# Patient Record
Sex: MALE | Race: BLACK OR AFRICAN AMERICAN | NOT HISPANIC OR LATINO | Employment: FULL TIME | URBAN - METROPOLITAN AREA
[De-identification: names, ages, dates, MRNs, and addresses within clinical notes are randomized per-mention and may not be internally consistent; named-entity substitution may affect disease eponyms.]

---

## 2023-05-10 ENCOUNTER — OFFICE VISIT (OUTPATIENT)
Dept: PRIMARY CARE | Facility: CLINIC | Age: 48
End: 2023-05-10
Payer: COMMERCIAL

## 2023-05-10 VITALS
DIASTOLIC BLOOD PRESSURE: 75 MMHG | SYSTOLIC BLOOD PRESSURE: 144 MMHG | HEART RATE: 94 BPM | BODY MASS INDEX: 33.37 KG/M2 | OXYGEN SATURATION: 98 % | HEIGHT: 67 IN | WEIGHT: 212.6 LBS | TEMPERATURE: 97.5 F

## 2023-05-10 DIAGNOSIS — E11.9 CONTROLLED TYPE 2 DIABETES MELLITUS WITHOUT COMPLICATION, WITH LONG-TERM CURRENT USE OF INSULIN (MULTI): Primary | ICD-10-CM

## 2023-05-10 DIAGNOSIS — Z79.4 CONTROLLED TYPE 2 DIABETES MELLITUS WITHOUT COMPLICATION, WITH LONG-TERM CURRENT USE OF INSULIN (MULTI): Primary | ICD-10-CM

## 2023-05-10 PROCEDURE — 3078F DIAST BP <80 MM HG: CPT

## 2023-05-10 PROCEDURE — 3077F SYST BP >= 140 MM HG: CPT

## 2023-05-10 PROCEDURE — 3052F HG A1C>EQUAL 8.0%<EQUAL 9.0%: CPT

## 2023-05-10 PROCEDURE — 1036F TOBACCO NON-USER: CPT

## 2023-05-10 PROCEDURE — 99213 OFFICE O/P EST LOW 20 MIN: CPT

## 2023-05-10 ASSESSMENT — PAIN SCALES - GENERAL: PAINLEVEL: 0-NO PAIN

## 2023-05-10 NOTE — PROGRESS NOTES
Subjective   Patient ID: Juan Mackey is a 47 y.o. male who presents for Follow-up (diabetes).    HPI    47-year-old male with history of rhinitis medicamentosa, DM type II (hemoglobin A1c 8.4 (January 2023), follicular nodular disease s/p total thyroidectomy, mild ANAI presenting for follow-up visit.  Patient requesting information regarding semaglutide.  Patient currently on Trulicity for DM which also benefits weight loss, however patient interested in trying semaglutide for weight loss as his wife has had success in the recent past with this medication.  Patient reports his fasting glucose at home has been ranging between 70-90.  Does report diarrhea contributed to metformin use.  Denies any chest pain, shortness of breath, dizziness    Review of Systems  As per [facility]    Objective       Physical Exam  HENT:      Head: Normocephalic and atraumatic.   Eyes:      General: No scleral icterus.     Conjunctiva/sclera: Conjunctivae normal.   Cardiovascular:      Rate and Rhythm: Normal rate and regular rhythm.      Heart sounds: No murmur heard.     No friction rub. No gallop.   Pulmonary:      Effort: Pulmonary effort is normal. No respiratory distress.      Breath sounds: Normal breath sounds.   Abdominal:      General: There is no distension.      Palpations: Abdomen is soft.      Tenderness: There is no abdominal tenderness.   Musculoskeletal:         General: Normal range of motion.   Skin:     General: Skin is warm and dry.   Neurological:      General: No focal deficit present.      Mental Status: He is alert and oriented to person, place, and time.   Psychiatric:         Mood and Affect: Mood normal.       Assessment/Plan   Problem List Items Addressed This Visit    None  Visit Diagnoses       Controlled type 2 diabetes mellitus without complication, with long-term current use of insulin (CMS/[facility])    -  Primary    Relevant Medications    semaglutide (OZEMPIC) 1 mg/dose (4 mg/3 mL) pen injector          46 yo M  with PMHx of Rhinitis medicamentosa, DM Type 2 (HbA1c 9.4% on 9/2022), Follicular Nodular Disease s/p Total Thyroidectomy, Mild ANAI presents to discuss semaglutide as alternative to Trulicity for DM management.    #Type 2 DM (Last HbA1c 9.4% on 9/2022)  -A1C 8.4% (January 2023)  -Discussed both GLP-1 agonist and that they promote reduction in A1c as well as promoting weight loss.  Patient interested in trialing semaglutide.  -Current weight as of 5/10/23 is 96.4 kg  -Stop Trulicity 4 mg dose  -Start semaglutide at 1 mg weekly injection.  Can titrate up if tolerating appropriately.  -Continue metformin 1000 mg every 12 hours, Basaglar 10 units every afternoon    Discussed with: Dr. Jeter  Return in : 1 month to reassess medication tolerability    Portions of this note were generated using digital voice recognition software, and may contain grammatical errors       Juan Roy, DO  PGY-1, Family Medicine

## 2023-05-16 NOTE — PROGRESS NOTES
I reviewed with the resident the medical history and the resident’s findings on physical examination.  I discussed with the resident the patient’s diagnosis and concur with the treatment plan as documented in the resident note.     Taryn Jeter MD

## 2023-06-29 ENCOUNTER — OFFICE VISIT (OUTPATIENT)
Dept: PRIMARY CARE | Facility: CLINIC | Age: 48
End: 2023-06-29
Payer: COMMERCIAL

## 2023-06-29 VITALS
SYSTOLIC BLOOD PRESSURE: 114 MMHG | RESPIRATION RATE: 16 BRPM | OXYGEN SATURATION: 99 % | TEMPERATURE: 96.3 F | DIASTOLIC BLOOD PRESSURE: 79 MMHG | BODY MASS INDEX: 31.4 KG/M2 | WEIGHT: 200.1 LBS | HEART RATE: 94 BPM | HEIGHT: 67 IN

## 2023-06-29 DIAGNOSIS — E11.9 TYPE 2 DIABETES MELLITUS WITHOUT COMPLICATION, WITHOUT LONG-TERM CURRENT USE OF INSULIN (MULTI): Primary | ICD-10-CM

## 2023-06-29 PROCEDURE — 3078F DIAST BP <80 MM HG: CPT

## 2023-06-29 PROCEDURE — 99213 OFFICE O/P EST LOW 20 MIN: CPT

## 2023-06-29 PROCEDURE — 1036F TOBACCO NON-USER: CPT

## 2023-06-29 PROCEDURE — 3052F HG A1C>EQUAL 8.0%<EQUAL 9.0%: CPT

## 2023-06-29 PROCEDURE — 3074F SYST BP LT 130 MM HG: CPT

## 2023-06-29 RX ORDER — SEMAGLUTIDE 1.34 MG/ML
1 INJECTION, SOLUTION SUBCUTANEOUS
Qty: 3 ML | Refills: 1 | Status: SHIPPED | OUTPATIENT
Start: 2023-06-29 | End: 2023-08-15 | Stop reason: SDUPTHER

## 2023-06-29 ASSESSMENT — PAIN SCALES - GENERAL: PAINLEVEL: 0-NO PAIN

## 2023-06-29 NOTE — PROGRESS NOTES
Subjective   Patient ID: Juan Mackey is a 47 y.o. male who presents for Follow-up. Med refill    HPI    #DM  - Request Ozempic refill  - Insurance rejected refill request initially  - Reports  nearly 20lbs weight loss on ozempic  - Tolerates well. Denies GI symptoms  - Continues to take metformin    Review of Systems   Constitutional:  Negative for fatigue, fever and unexpected weight change.   Respiratory:  Negative for cough, shortness of breath and wheezing.    Cardiovascular:  Negative for chest pain, palpitations and leg swelling.   Gastrointestinal:  Negative for abdominal pain, diarrhea, nausea and vomiting.   Neurological:  Negative for dizziness, light-headedness and headaches.       Previous history  No past medical history on file.  Past Surgical History:   Procedure Laterality Date    OTHER SURGICAL HISTORY  08/28/2020    Appendectomy    OTHER SURGICAL HISTORY  07/19/2022    Corneal lasik     Social History     Tobacco Use    Smoking status: Never     Passive exposure: Never    Smokeless tobacco: Never   Substance Use Topics    Alcohol use: Never    Drug use: Never     No family history on file.  Allergies   Allergen Reactions    Shrimp Angioedema    Oxycodone Dizziness     Current Outpatient Medications   Medication Instructions    Ozempic 1 mg, subcutaneous, Weekly       Objective       Physical Exam  HENT:      Head: Normocephalic and atraumatic.   Eyes:      General: No scleral icterus.     Conjunctiva/sclera: Conjunctivae normal.   Cardiovascular:      Rate and Rhythm: Normal rate and regular rhythm.      Heart sounds: No murmur heard.     No friction rub. No gallop.   Pulmonary:      Effort: Pulmonary effort is normal. No respiratory distress.      Breath sounds: Normal breath sounds.   Abdominal:      General: There is no distension.      Palpations: Abdomen is soft.      Tenderness: There is no abdominal tenderness.   Musculoskeletal:         General: Normal range of motion.   Skin:      General: Skin is warm and dry.   Neurological:      General: No focal deficit present.      Mental Status: He is alert and oriented to person, place, and time.   Psychiatric:         Mood and Affect: Mood normal.       Assessment/Plan   Juan Mackey is a 47 y.o. male who presents for the concerns below:    Problem List Items Addressed This Visit    None  Visit Diagnoses       Type 2 diabetes mellitus without complication, without long-term current use of insulin (CMS/[facility])    -  Primary    Relevant Medications    semaglutide (Ozempic) 1 mg/dose (4 mg/3 mL) pen injector    Other Relevant Orders    Hemoglobin A1c          #DM  - Ozempic refill  - Continues to take metformin  - Repeat A1C in 2 months       Discussed with:  Dr. Roldan  Return in :2 months    Portions of this note were generated using digital voice recognition software, and may contain grammatical errors       Juan Roy, DO  PGY-1, Family Medicine

## 2023-06-30 ASSESSMENT — ENCOUNTER SYMPTOMS
DIZZINESS: 0
DIARRHEA: 0
NAUSEA: 0
FATIGUE: 0
WHEEZING: 0
LIGHT-HEADEDNESS: 0
FEVER: 0
ABDOMINAL PAIN: 0
SHORTNESS OF BREATH: 0
UNEXPECTED WEIGHT CHANGE: 0
COUGH: 0
PALPITATIONS: 0
VOMITING: 0
HEADACHES: 0

## 2023-07-03 NOTE — PROGRESS NOTES
I reviewed with the resident the medical history and the resident’s findings on physical examination.  I discussed with the resident the patient’s diagnosis and concur with the treatment plan as documented in the resident note.     Zach Roldan MD

## 2023-08-12 ENCOUNTER — LAB (OUTPATIENT)
Dept: LAB | Facility: LAB | Age: 48
End: 2023-08-12
Payer: COMMERCIAL

## 2023-08-12 DIAGNOSIS — E11.9 TYPE 2 DIABETES MELLITUS WITHOUT COMPLICATION, WITHOUT LONG-TERM CURRENT USE OF INSULIN (MULTI): ICD-10-CM

## 2023-08-12 LAB
ESTIMATED AVERAGE GLUCOSE FOR HBA1C: 364 MG/DL
HEMOGLOBIN A1C/HEMOGLOBIN TOTAL IN BLOOD: 14.3 %

## 2023-08-12 PROCEDURE — 83036 HEMOGLOBIN GLYCOSYLATED A1C: CPT

## 2023-08-12 PROCEDURE — 36415 COLL VENOUS BLD VENIPUNCTURE: CPT

## 2023-08-15 ENCOUNTER — OFFICE VISIT (OUTPATIENT)
Dept: PRIMARY CARE | Facility: CLINIC | Age: 48
End: 2023-08-15
Payer: COMMERCIAL

## 2023-08-15 VITALS
OXYGEN SATURATION: 98 % | HEART RATE: 87 BPM | WEIGHT: 200 LBS | HEIGHT: 67 IN | SYSTOLIC BLOOD PRESSURE: 128 MMHG | BODY MASS INDEX: 31.39 KG/M2 | DIASTOLIC BLOOD PRESSURE: 87 MMHG | TEMPERATURE: 97.9 F

## 2023-08-15 DIAGNOSIS — E11.9 TYPE 2 DIABETES MELLITUS WITHOUT COMPLICATION, WITHOUT LONG-TERM CURRENT USE OF INSULIN (MULTI): Primary | ICD-10-CM

## 2023-08-15 DIAGNOSIS — T75.3XXA MOTION SICKNESS, INITIAL ENCOUNTER: ICD-10-CM

## 2023-08-15 PROCEDURE — 1036F TOBACCO NON-USER: CPT | Performed by: STUDENT IN AN ORGANIZED HEALTH CARE EDUCATION/TRAINING PROGRAM

## 2023-08-15 PROCEDURE — 3074F SYST BP LT 130 MM HG: CPT | Performed by: STUDENT IN AN ORGANIZED HEALTH CARE EDUCATION/TRAINING PROGRAM

## 2023-08-15 PROCEDURE — 3046F HEMOGLOBIN A1C LEVEL >9.0%: CPT | Performed by: STUDENT IN AN ORGANIZED HEALTH CARE EDUCATION/TRAINING PROGRAM

## 2023-08-15 PROCEDURE — 99214 OFFICE O/P EST MOD 30 MIN: CPT | Performed by: STUDENT IN AN ORGANIZED HEALTH CARE EDUCATION/TRAINING PROGRAM

## 2023-08-15 PROCEDURE — 3079F DIAST BP 80-89 MM HG: CPT | Performed by: STUDENT IN AN ORGANIZED HEALTH CARE EDUCATION/TRAINING PROGRAM

## 2023-08-15 RX ORDER — INSULIN GLARGINE 100 [IU]/ML
10 INJECTION, SOLUTION SUBCUTANEOUS NIGHTLY
COMMUNITY
End: 2023-08-15 | Stop reason: SDUPTHER

## 2023-08-15 RX ORDER — LEVOTHYROXINE SODIUM 150 UG/1
150 TABLET ORAL
COMMUNITY
Start: 2023-07-22 | End: 2024-04-17 | Stop reason: SDUPTHER

## 2023-08-15 RX ORDER — SEMAGLUTIDE 1.34 MG/ML
2 INJECTION, SOLUTION SUBCUTANEOUS
Qty: 6 ML | Refills: 1 | Status: SHIPPED | OUTPATIENT
Start: 2023-08-15 | End: 2023-10-30 | Stop reason: SDUPTHER

## 2023-08-15 RX ORDER — INSULIN GLARGINE 100 [IU]/ML
10 INJECTION, SOLUTION SUBCUTANEOUS NIGHTLY
Qty: 9 ML | Refills: 0 | Status: SHIPPED | OUTPATIENT
Start: 2023-08-15 | End: 2023-12-26 | Stop reason: SDUPTHER

## 2023-08-15 RX ORDER — SCOLOPAMINE TRANSDERMAL SYSTEM 1 MG/1
1 PATCH, EXTENDED RELEASE TRANSDERMAL
Qty: 10 PATCH | Refills: 0 | Status: SHIPPED | OUTPATIENT
Start: 2023-08-15 | End: 2023-09-14

## 2023-08-15 RX ORDER — METFORMIN HYDROCHLORIDE 1000 MG/1
1000 TABLET ORAL
Qty: 180 TABLET | Refills: 0 | Status: SHIPPED | OUTPATIENT
Start: 2023-08-15 | End: 2023-11-22

## 2023-08-15 RX ORDER — METFORMIN HYDROCHLORIDE 1000 MG/1
1000 TABLET ORAL
COMMUNITY
End: 2023-08-15 | Stop reason: SDUPTHER

## 2023-08-15 ASSESSMENT — PAIN SCALES - GENERAL: PAINLEVEL: 0-NO PAIN

## 2023-08-15 NOTE — PROGRESS NOTES
"Subjective   Patient ID: Juan Mackey is a 47 y.o. male who presents for Follow-up and New Med Request (For Nausea, and refill on Ozempic).    HPI   Juan Mackey is a 47 y.o. male who presents for Follow-up on DM2.  Patient requesting medication for motion sickness for upcoming medication.    # Diabetes  - Current regimen: Metformin 1000 mg BID, Basaglar 10 units QHS, Ozempic 1 mg weekly.  Report being compliant with medications.  - Home Glucose Monitorin-100's in AM, fasting  - Hypoglycemic episodes: denies  - Diabetes Surveillance: A1c 8.4 -> 14.3%  - Denies any polyuria, polydipsia, dysuria, fatigue, weight loss, changes in vision, changes in bowels, HA dizziness, lightheadedness, or weakness.      Review of Systems    ROS:    - CONSTITUTIONAL: Denies weight loss, fever and chills.    - HEENT: Denies changes in vision and hearing.    - RESPIRATORY: Denies SOB and cough.    - CV: Denies palpitations and CP.    - GI: Denies abdominal pain, nausea, vomiting and diarrhea.    - : Denies dysuria and urinary frequency.    - MSK: Denies myalgia and joint pain.    - SKIN: Denies rash and pruritus.    - NEUROLOGICAL: Denies headache and syncope.    - PSYCHIATRIC: Denies recent changes in mood. Denies anxiety and depression.    A 12-point review of systems was completed and is otherwise negative except as noted in the HPI.      Objective   /87 (BP Location: Left arm, Patient Position: Sitting, BP Cuff Size: Adult)   Pulse 87   Temp 36.6 °C (97.9 °F) (Temporal)   Ht 1.702 m (5' 7\")   Wt 90.7 kg (200 lb)   SpO2 98%   BMI 31.32 kg/m²     Lab Review  Glucose (mg/dL)   Date Value   2023 161 (H)   2022 184 (H)   2021 129 (H)     Hemoglobin A1C (%)   Date Value   2023 14.3 (A)   2023 8.4 (A)   2022 9.4 (A)     Bicarbonate (mmol/L)   Date Value   2023 29   2022 27   2021 27     Urea Nitrogen (mg/dL)   Date Value   2023 10   2022 17   2021 " 15     Creatinine (mg/dL)   Date Value   01/07/2023 1.07   05/13/2022 1.01   01/16/2021 1.03       Physical Exam    PHYSICAL EXAM:    - GENERAL: Alert and oriented x 3. No acute distress. Well-nourished.    - EYES: EOMI. Anicteric.    - HENT: Moist mucous membranes. No scleral icterus. No cervical lymphadenopathy.    - LUNGS: Clear to auscultation bilaterally. No accessory muscle use.    - CARDIOVASCULAR: Regular rate and rhythm. No murmur. No JVD.    - ABDOMEN: Soft, non-tender and non-distended. No palpable masses.    - EXTREMITIES: No edema. Non-tender.    - SKIN: No rashes or lesions. Warm.    - NEUROLOGIC: No focal neurological deficits. CN II-XII grossly intact, but not individually tested.    - PSYCHIATRIC: Cooperative. Appropriate mood and affect.      Assessment/Plan       Juan Mackey is a 47 y.o. male who presents for Follow-up on DM2.     #DM2, uncontrolled   -     insulin glargine (Basaglar KwikPen U-100 Insulin) 100 unit/mL (3 mL) pen; Inject 10 Units under the skin once daily at bedtime. Take as directed per insulin instructions.  -     metFORMIN (Glucophage) 1,000 mg tablet; Take 1 tablet (1,000 mg) by mouth 2 times a day with meals.  -     semaglutide (Ozempic) 1 mg/dose (4 mg/3 mL) pen injector; Inject 2 mg under the skin 1 (one) time per week.  - A1c could be 2/2 lab error given good glucose control at home and compliance with medications.. Plan to repeat in 4 weeks along with renal function panel.  -Return to clinic for follow-up in 4 weeks with Dr. Roy      RX changes: Increased Ozempic from 1 mg to 2 mg weekly based on available formulations.    Education:  blood sugar goals, exercise, and nutrition  Follow up: I recommend diabetes care be 1 month.    #Motion sickness. Planned travel   -     scopolamine (Transderm-Scop) 1 mg over 3 days patch 3 day; Place 1 patch over 72 hours on the skin every 3rd day if needed (nausea).      Patient seen and discussed with attending physician   Gabriel Mann MD  Family Medicine, PGY-3    This note was completed using Dragon voice recognition technology and may include unintended errors with respect to translation of words, typographical errors or grammar errors which may not have been identified while finalizing the chart.

## 2023-08-22 NOTE — PROGRESS NOTES
I saw and evaluated the patient. I personally obtained the key and critical portions of the history and physical exam or was physically present for key and critical portions performed by the resident/fellow. I reviewed the resident/fellow's documentation and discussed the patient with the resident/fellow. I agree with the resident/fellow's medical decision making as documented in the note.    Note he has had 6 kg of intended weight loss--thus we did not increase basal insulin, since FBG has been in or near desired ranged according to his reports, but did recommend continuing ozempic and following up with A1C next month.     Shannon Rios MD

## 2023-10-02 ENCOUNTER — OFFICE VISIT (OUTPATIENT)
Dept: PRIMARY CARE | Facility: CLINIC | Age: 48
End: 2023-10-02
Payer: COMMERCIAL

## 2023-10-02 VITALS
BODY MASS INDEX: 31.23 KG/M2 | HEART RATE: 88 BPM | OXYGEN SATURATION: 100 % | WEIGHT: 199 LBS | DIASTOLIC BLOOD PRESSURE: 71 MMHG | SYSTOLIC BLOOD PRESSURE: 110 MMHG | TEMPERATURE: 98 F | HEIGHT: 67 IN

## 2023-10-02 DIAGNOSIS — E11.8 CONTROLLED DIABETES MELLITUS TYPE 2 WITH COMPLICATIONS, UNSPECIFIED WHETHER LONG TERM INSULIN USE (MULTI): Primary | ICD-10-CM

## 2023-10-02 PROCEDURE — 3078F DIAST BP <80 MM HG: CPT

## 2023-10-02 PROCEDURE — 3074F SYST BP LT 130 MM HG: CPT

## 2023-10-02 PROCEDURE — 3046F HEMOGLOBIN A1C LEVEL >9.0%: CPT

## 2023-10-02 PROCEDURE — 99213 OFFICE O/P EST LOW 20 MIN: CPT

## 2023-10-02 PROCEDURE — 1036F TOBACCO NON-USER: CPT

## 2023-10-02 PROCEDURE — 90686 IIV4 VACC NO PRSV 0.5 ML IM: CPT

## 2023-10-02 PROCEDURE — 90471 IMMUNIZATION ADMIN: CPT

## 2023-10-02 PROCEDURE — 4010F ACE/ARB THERAPY RXD/TAKEN: CPT

## 2023-10-02 RX ORDER — LISINOPRIL 2.5 MG/1
2.5 TABLET ORAL DAILY
Qty: 30 TABLET | Refills: 5 | Status: SHIPPED | OUTPATIENT
Start: 2023-10-02 | End: 2024-05-22 | Stop reason: RX

## 2023-10-02 RX ORDER — INSULIN PUMP SYRINGE, 3 ML
1 EACH MISCELLANEOUS ONCE
Qty: 1 EACH | Refills: 0 | Status: SHIPPED | OUTPATIENT
Start: 2023-10-02 | End: 2023-10-02

## 2023-10-02 ASSESSMENT — ENCOUNTER SYMPTOMS
LOSS OF SENSATION IN FEET: 0
DEPRESSION: 0
OCCASIONAL FEELINGS OF UNSTEADINESS: 0

## 2023-10-02 ASSESSMENT — PATIENT HEALTH QUESTIONNAIRE - PHQ9
2. FEELING DOWN, DEPRESSED OR HOPELESS: NOT AT ALL
SUM OF ALL RESPONSES TO PHQ9 QUESTIONS 1 AND 2: 0
1. LITTLE INTEREST OR PLEASURE IN DOING THINGS: NOT AT ALL

## 2023-10-02 ASSESSMENT — COLUMBIA-SUICIDE SEVERITY RATING SCALE - C-SSRS
2. HAVE YOU ACTUALLY HAD ANY THOUGHTS OF KILLING YOURSELF?: NO
1. IN THE PAST MONTH, HAVE YOU WISHED YOU WERE DEAD OR WISHED YOU COULD GO TO SLEEP AND NOT WAKE UP?: NO
6. HAVE YOU EVER DONE ANYTHING, STARTED TO DO ANYTHING, OR PREPARED TO DO ANYTHING TO END YOUR LIFE?: NO

## 2023-10-02 ASSESSMENT — PAIN SCALES - GENERAL: PAINLEVEL: 0-NO PAIN

## 2023-10-02 NOTE — PROGRESS NOTES
Subjective   47-year-old male with history of ANAI, rhinitis medicamentosa, follicular thyroid CA s/p thyroidectomy, and type 2 diabetes presenting for follow-up.    HPI  Patient was recently found in August 2023 to have A1c of 14.3, although patient did maintain home blood glucose readings 150s-160s.  At that time, patient Ozempic dose increased from 1 mg to 2 mg weekly and instructed to continue his current DM management with metformin and Lantus.  Patient states that he is tolerating his increased Ozempic dose well without any GI side effects.  Denies any headache, nausea, vomiting, chest pain, shortness of breath.  Denies any foot ulcerations or vision changes.  Patient does request new glucose monitor as it is previous Freestyle lite is no longer functioning.    Review of Systems  As per [facility]    Previous history  No past medical history on file.  Past Surgical History:   Procedure Laterality Date    OTHER SURGICAL HISTORY  08/28/2020    Appendectomy    OTHER SURGICAL HISTORY  07/19/2022    Corneal lasik     Social History     Tobacco Use    Smoking status: Never     Passive exposure: Never    Smokeless tobacco: Never   Substance Use Topics    Alcohol use: Never    Drug use: Never     No family history on file.  Allergies   Allergen Reactions    Shrimp Angioedema    Oxycodone Dizziness     Current Outpatient Medications   Medication Instructions    FreeStyle glucose monitoring (FreeStyle Lite Meter) kit 1 each, miscellaneous, Once    insulin glargine (BASAGLAR KWIKPEN U-100 INSULIN) 10 Units, subcutaneous, Nightly, Take as directed per insulin instructions.    levothyroxine (SYNTHROID, LEVOXYL) 150 mcg, oral, Daily before breakfast    lisinopril 2.5 mg, oral, Daily    metFORMIN (GLUCOPHAGE) 1,000 mg, oral, 2 times daily with meals    Ozempic 2 mg, subcutaneous, Weekly       Objective   Physical Exam  HENT:      Head: Normocephalic and atraumatic.   Eyes:      General: No scleral icterus.     Conjunctiva/sclera:  Conjunctivae normal.   Cardiovascular:      Rate and Rhythm: Normal rate and regular rhythm.      Heart sounds: No murmur heard.     No friction rub. No gallop.   Pulmonary:      Effort: Pulmonary effort is normal. No respiratory distress.      Breath sounds: Normal breath sounds.   Abdominal:      General: There is no distension.      Palpations: Abdomen is soft.      Tenderness: There is no abdominal tenderness.   Musculoskeletal:         General: Normal range of motion.   Skin:     General: Skin is warm and dry.   Neurological:      General: No focal deficit present.      Mental Status: He is alert and oriented to person, place, and time.   Psychiatric:         Mood and Affect: Mood normal.     Assessment/Plan   Juan Mackey is a 47 y.o. male who presents for the concerns below:    Problem List Items Addressed This Visit    None  Visit Diagnoses       Controlled diabetes mellitus type 2 with complications, unspecified whether long term insulin use (CMS/[facility])    -  Primary    Relevant Medications    FreeStyle glucose monitoring (FreeStyle Lite Meter) kit    lisinopril 2.5 mg tablet          #DM2  -14.3% 8/23  -Reordered Freestyle lite glucose monitor  -Schedule repeat RFP, A1c, and protein/creatinine ratio urine for next month  - C/w ozempic 2mg  weekly  -Continue with Lantus and metformin  -Start Renal protection w/ Lisinopril 2.5mg qd    #Health maintenance  -Provided flu vaccine today     Discussed with: Dr. Muller  Return in : 3 months    Portions of this note were generated using digital voice recognition software, and may contain grammatical errors     Juan Roy, DO  PGY-2, Family Medicine

## 2023-10-06 NOTE — PROGRESS NOTES
I reviewed the resident/fellow's documentation and discussed the patient with the resident/fellow. I agree with the resident/fellow's medical decision making as documented in the note.     Sarah Muller MD

## 2023-10-26 ENCOUNTER — TELEPHONE (OUTPATIENT)
Dept: PRIMARY CARE | Facility: CLINIC | Age: 48
End: 2023-10-26

## 2023-10-26 DIAGNOSIS — E11.9 TYPE 2 DIABETES MELLITUS WITHOUT COMPLICATION, WITHOUT LONG-TERM CURRENT USE OF INSULIN (MULTI): ICD-10-CM

## 2023-10-30 DIAGNOSIS — E11.9 TYPE 2 DIABETES MELLITUS WITHOUT COMPLICATION, WITHOUT LONG-TERM CURRENT USE OF INSULIN (MULTI): ICD-10-CM

## 2023-10-30 RX ORDER — SEMAGLUTIDE 1.34 MG/ML
2 INJECTION, SOLUTION SUBCUTANEOUS
Refills: 1 | OUTPATIENT
Start: 2023-10-30

## 2023-10-30 RX ORDER — SEMAGLUTIDE 1.34 MG/ML
2 INJECTION, SOLUTION SUBCUTANEOUS
Qty: 6 ML | Refills: 1 | Status: SHIPPED | OUTPATIENT
Start: 2023-10-30 | End: 2023-12-21 | Stop reason: SDUPTHER

## 2023-11-03 NOTE — TELEPHONE ENCOUNTER
Rx Refill Request Telephone Encounter    Name:  Juan Mackey  :  [id number]  Medication Name:  semaglutide (Ozempic) 1 mg/dose (4 mg/3 mL) pen injector             Specific Pharmacy location:   [id number] [address] [address] [place]    Date of last appointment:  10/02/23  Date of next appointment:    Best number to reach patient:

## 2023-11-07 NOTE — TELEPHONE ENCOUNTER
Rx Refill Request Telephone Encounter    Name:  Juan Mackey  :  [id number]  Medication Name:  Ozempic            Specific Pharmacy location:  [facility]  Date of last appointment:  10/2/2023  Date of next appointment:  2023  Best number to reach patient:  [phone]    Patient states he is confused because he started with Dr Roy taking 1 dose, but is now taking 2 doses with Dr Mann. Patient also states that he will not have enough medication to last him until his December appointment. Patient would like a call back

## 2023-11-18 DIAGNOSIS — E11.9 TYPE 2 DIABETES MELLITUS WITHOUT COMPLICATION, WITHOUT LONG-TERM CURRENT USE OF INSULIN (MULTI): ICD-10-CM

## 2023-11-22 RX ORDER — METFORMIN HYDROCHLORIDE 1000 MG/1
1000 TABLET ORAL
Qty: 180 TABLET | Refills: 0 | Status: SHIPPED | OUTPATIENT
Start: 2023-11-22 | End: 2024-02-22 | Stop reason: SDUPTHER

## 2023-12-21 ENCOUNTER — OFFICE VISIT (OUTPATIENT)
Dept: PRIMARY CARE | Facility: CLINIC | Age: 48
End: 2023-12-21
Payer: COMMERCIAL

## 2023-12-21 VITALS
BODY MASS INDEX: 31.48 KG/M2 | SYSTOLIC BLOOD PRESSURE: 127 MMHG | WEIGHT: 200.6 LBS | TEMPERATURE: 97.9 F | OXYGEN SATURATION: 97 % | HEIGHT: 67 IN | DIASTOLIC BLOOD PRESSURE: 87 MMHG | HEART RATE: 102 BPM

## 2023-12-21 DIAGNOSIS — E11.9 TYPE 2 DIABETES MELLITUS WITHOUT COMPLICATION, WITHOUT LONG-TERM CURRENT USE OF INSULIN (MULTI): ICD-10-CM

## 2023-12-21 PROCEDURE — 99213 OFFICE O/P EST LOW 20 MIN: CPT

## 2023-12-21 PROCEDURE — 3074F SYST BP LT 130 MM HG: CPT

## 2023-12-21 PROCEDURE — 4010F ACE/ARB THERAPY RXD/TAKEN: CPT

## 2023-12-21 PROCEDURE — 3046F HEMOGLOBIN A1C LEVEL >9.0%: CPT

## 2023-12-21 PROCEDURE — 1036F TOBACCO NON-USER: CPT

## 2023-12-21 PROCEDURE — 3079F DIAST BP 80-89 MM HG: CPT

## 2023-12-21 ASSESSMENT — PAIN SCALES - GENERAL: PAINLEVEL: 0-NO PAIN

## 2023-12-21 NOTE — PROGRESS NOTES
Subjective   Patient ID: Juan Mackey is a 48 y.o. male who presents for Follow-up.    HPI    #DM2  - last seen in October  - A1C 14.3% 8/23. Repeat A1C never ordered  - Reports stopped taking Lisinopril 2.5mg because he never received his updated A1C  -Currently using Freestyle lite glucose monitor. BG within normal limits  - taking ozempic 2mg  weekly, Lantus 10U nightly, and metformin 1000mg BID    Review of Systems   Constitutional:  Negative for fatigue, fever and unexpected weight change.   Respiratory:  Negative for cough, shortness of breath and wheezing.    Cardiovascular:  Negative for chest pain, palpitations and leg swelling.   Gastrointestinal:  Negative for abdominal pain, diarrhea, nausea and vomiting.   Neurological:  Negative for dizziness, light-headedness and headaches.     Previous history  No past medical history on file.  Past Surgical History:   Procedure Laterality Date    OTHER SURGICAL HISTORY  08/28/2020    Appendectomy    OTHER SURGICAL HISTORY  07/19/2022    Corneal lasik     Social History     Tobacco Use    Smoking status: Never     Passive exposure: Never    Smokeless tobacco: Never   Substance Use Topics    Alcohol use: Never    Drug use: Never     No family history on file.  Allergies   Allergen Reactions    Shrimp Angioedema    Oxycodone Dizziness     Current Outpatient Medications   Medication Instructions    insulin glargine (BASAGLAR KWIKPEN U-100 INSULIN) 10 Units, subcutaneous, Nightly, Take as directed per insulin instructions.    levothyroxine (SYNTHROID, LEVOXYL) 150 mcg, oral, Daily before breakfast    lisinopril 2.5 mg, oral, Daily    metFORMIN (GLUCOPHAGE) 1,000 mg, oral, 2 times daily with meals    semaglutide 2 mg, subcutaneous, Weekly       Objective       Physical Exam  HENT:      Head: Normocephalic and atraumatic.   Eyes:      General: No scleral icterus.     Conjunctiva/sclera: Conjunctivae normal.   Cardiovascular:      Rate and Rhythm: Normal rate and regular  rhythm.      Heart sounds: No murmur heard.     No friction rub. No gallop.   Pulmonary:      Effort: Pulmonary effort is normal. No respiratory distress.      Breath sounds: Normal breath sounds.   Abdominal:      General: There is no distension.      Palpations: Abdomen is soft.      Tenderness: There is no abdominal tenderness.   Musculoskeletal:         General: Normal range of motion.   Skin:     General: Skin is warm and dry.   Neurological:      General: No focal deficit present.      Mental Status: He is alert and oriented to person, place, and time.   Psychiatric:         Mood and Affect: Mood normal.       Assessment/Plan   Juan Mackey is a 48 y.o. male who presents for the concerns below:    Problem List Items Addressed This Visit    None  Visit Diagnoses       Type 2 diabetes mellitus without complication, without long-term current use of insulin (CMS/[facility])        Relevant Medications    semaglutide 2 mg/dose (8 mg/3 mL) pen injector    insulin glargine (Basaglar KwikPen U-100 Insulin) 100 unit/mL (3 mL) pen    Other Relevant Orders    Hemoglobin A1c        #DM2  - Repeat A1C  - Encouraged to continue Lisinopril 2.5mg for added renal protection  - Reorder ozempic 2mg  weekly and Lantus 10U nightly  - c/w metformin 1000mg BID      Discussed with:  Dr. Roldan  Return in : 3 months    Portions of this note were generated using digital voice recognition software, and may contain grammatical errors       Juan Roy, DO  PGY-2, Family Medicine

## 2023-12-26 RX ORDER — INSULIN GLARGINE 100 [IU]/ML
10 INJECTION, SOLUTION SUBCUTANEOUS NIGHTLY
Qty: 9 ML | Refills: 3 | Status: SHIPPED | OUTPATIENT
Start: 2023-12-26 | End: 2024-02-22

## 2023-12-26 ASSESSMENT — ENCOUNTER SYMPTOMS
COUGH: 0
UNEXPECTED WEIGHT CHANGE: 0
FEVER: 0
DIARRHEA: 0
HEADACHES: 0
NAUSEA: 0
DIZZINESS: 0
LIGHT-HEADEDNESS: 0
VOMITING: 0
PALPITATIONS: 0
ABDOMINAL PAIN: 0
FATIGUE: 0
SHORTNESS OF BREATH: 0
WHEEZING: 0

## 2023-12-28 NOTE — PROGRESS NOTES
I reviewed the resident/fellow's documentation and discussed the patient with the resident/fellow. I agree with the resident/fellow's medical decision making as documented in the note.    Zach Roldan MD

## 2024-01-29 ENCOUNTER — DOCUMENTATION (OUTPATIENT)
Dept: PRIMARY CARE | Facility: CLINIC | Age: 49
End: 2024-01-29
Payer: COMMERCIAL

## 2024-01-29 ENCOUNTER — TELEPHONE (OUTPATIENT)
Dept: PRIMARY CARE | Facility: CLINIC | Age: 49
End: 2024-01-29
Payer: COMMERCIAL

## 2024-02-17 ENCOUNTER — LAB (OUTPATIENT)
Dept: LAB | Facility: LAB | Age: 49
End: 2024-02-17
Payer: COMMERCIAL

## 2024-02-17 DIAGNOSIS — E11.9 TYPE 2 DIABETES MELLITUS WITHOUT COMPLICATION, WITHOUT LONG-TERM CURRENT USE OF INSULIN (MULTI): ICD-10-CM

## 2024-02-17 LAB
EST. AVERAGE GLUCOSE BLD GHB EST-MCNC: 341 MG/DL
HBA1C MFR BLD: 13.5 %

## 2024-02-17 PROCEDURE — 83036 HEMOGLOBIN GLYCOSYLATED A1C: CPT

## 2024-02-17 PROCEDURE — 36415 COLL VENOUS BLD VENIPUNCTURE: CPT

## 2024-02-22 ENCOUNTER — OFFICE VISIT (OUTPATIENT)
Dept: PRIMARY CARE | Facility: CLINIC | Age: 49
End: 2024-02-22
Payer: COMMERCIAL

## 2024-02-22 VITALS
DIASTOLIC BLOOD PRESSURE: 89 MMHG | HEART RATE: 90 BPM | OXYGEN SATURATION: 99 % | SYSTOLIC BLOOD PRESSURE: 135 MMHG | TEMPERATURE: 97.3 F | BODY MASS INDEX: 31.77 KG/M2 | HEIGHT: 67 IN | WEIGHT: 202.4 LBS

## 2024-02-22 DIAGNOSIS — E11.9 TYPE 2 DIABETES MELLITUS WITHOUT COMPLICATION, WITHOUT LONG-TERM CURRENT USE OF INSULIN (MULTI): Primary | ICD-10-CM

## 2024-02-22 DIAGNOSIS — T75.3XXA MOTION SICKNESS, INITIAL ENCOUNTER: ICD-10-CM

## 2024-02-22 LAB — POC FINGERSTICK BLOOD GLUCOSE: 256 MG/DL (ref 70–100)

## 2024-02-22 PROCEDURE — 82962 GLUCOSE BLOOD TEST: CPT

## 2024-02-22 PROCEDURE — 3075F SYST BP GE 130 - 139MM HG: CPT

## 2024-02-22 PROCEDURE — 99213 OFFICE O/P EST LOW 20 MIN: CPT

## 2024-02-22 PROCEDURE — 3046F HEMOGLOBIN A1C LEVEL >9.0%: CPT

## 2024-02-22 PROCEDURE — 1036F TOBACCO NON-USER: CPT

## 2024-02-22 PROCEDURE — 4010F ACE/ARB THERAPY RXD/TAKEN: CPT

## 2024-02-22 PROCEDURE — 3079F DIAST BP 80-89 MM HG: CPT

## 2024-02-22 RX ORDER — SCOLOPAMINE TRANSDERMAL SYSTEM 1 MG/1
1 PATCH, EXTENDED RELEASE TRANSDERMAL
Qty: 10 PATCH | Refills: 0 | Status: SHIPPED | OUTPATIENT
Start: 2024-02-22 | End: 2024-04-22

## 2024-02-22 RX ORDER — METFORMIN HYDROCHLORIDE 1000 MG/1
1000 TABLET ORAL
Qty: 180 TABLET | Refills: 0 | Status: SHIPPED | OUTPATIENT
Start: 2024-02-22 | End: 2024-05-22 | Stop reason: SDUPTHER

## 2024-02-22 RX ORDER — INSULIN GLARGINE 100 [IU]/ML
13 INJECTION, SOLUTION SUBCUTANEOUS NIGHTLY
Qty: 11.7 ML | Refills: 3 | Status: SHIPPED | OUTPATIENT
Start: 2024-02-22 | End: 2024-03-01 | Stop reason: SDUPTHER

## 2024-02-22 ASSESSMENT — PAIN SCALES - GENERAL: PAINLEVEL: 0-NO PAIN

## 2024-02-22 NOTE — PROGRESS NOTES
Subjective   Patient ID: Juan Mackey is a 48 y.o. male who presents for Follow-up.    HPI  #DM2  - Repeat A1C 13.5 Feb 2024 (14.3 Aug 2023)  - Lisinopril 2.5mg for added renal protection  - takes ozempic 2mg  weekly and Lantus 10U nightly  - takes metformin 1000mg BID  - am BG 170s previously 70s a couple months ago  - reinforced diabetic diet avoid carbs  - exercise limited by time. Should have more available time after debut[place]    #flight anxiety   - plan to go to [facility] ( 7 hour flight)    Review of Systems    Previous history  No past medical history on file.  Past Surgical History:   Procedure Laterality Date    OTHER SURGICAL HISTORY  08/28/2020    Appendectomy    OTHER SURGICAL HISTORY  07/19/2022    Corneal lasik     Social History     Tobacco Use    Smoking status: Never     Passive exposure: Never    Smokeless tobacco: Never   Substance Use Topics    Alcohol use: Never    Drug use: Never     No family history on file.  Allergies   Allergen Reactions    Shrimp Angioedema    Oxycodone Dizziness     Current Outpatient Medications   Medication Instructions    insulin glargine (LANTUS) 13 Units, subcutaneous, Nightly, Take as directed per insulin instructions    levothyroxine (SYNTHROID, LEVOXYL) 150 mcg, oral, Daily before breakfast    lisinopril 2.5 mg, oral, Daily    metFORMIN (GLUCOPHAGE) 1,000 mg, oral, 2 times daily with meals    scopolamine (Transderm-Scop) 1 mg over 3 days patch 3 day 1 patch, transdermal, Every 72 hours PRN    semaglutide 2 mg, subcutaneous, Weekly       Objective       Physical Exam  HENT:      Head: Normocephalic and atraumatic.   Eyes:      General: No scleral icterus.     Conjunctiva/sclera: Conjunctivae normal.   Cardiovascular:      Rate and Rhythm: Normal rate and regular rhythm.      Heart sounds: No murmur heard.     No friction rub. No gallop.   Pulmonary:      Effort: Pulmonary effort is normal. No respiratory distress.      Breath sounds: Normal breath sounds.    Abdominal:      General: There is no distension.      Palpations: Abdomen is soft.      Tenderness: There is no abdominal tenderness.   Musculoskeletal:         General: Normal range of motion.   Skin:     General: Skin is warm and dry.   Neurological:      General: No focal deficit present.      Mental Status: He is alert and oriented to person, place, and time.   Psychiatric:         Mood and Affect: Mood normal.           Assessment/Plan   Juan Maceky is a 48 y.o. male who presents for the concerns below:    Problem List Items Addressed This Visit    None  Visit Diagnoses       Type 2 diabetes mellitus without complication, without long-term current use of insulin (CMS/[facility])    -  Primary    Relevant Medications    metFORMIN (Glucophage) 1,000 mg tablet    insulin glargine (Lantus) 100 unit/mL injection    Other Relevant Orders    POCT Fingerstick Glucose manually resulted    Motion sickness, initial encounter        Relevant Medications    scopolamine (Transderm-Scop) 1 mg over 3 days patch 3 day          #DM2  - A1C 13.5  - Increase lantus to 13U nightly  -continue Lisinopril 2.5mg for added renal protection  - c/w metformin 1000mg BID, ozempic 2mg  weekly  - trial increase then consult endocrinolgy if no improvement  - bring supplies and blood glucose logs. Re ordered BG monitoring supply kit  - blood glucose in office today 256    #flight anxiety   :: plan to go to [facility] ( 7 hour flight)  -order scopolamine and lorazepam 1mg x4    Discussed with:   Dr. Rios  Return in : 1 month    Portions of this note were generated using digital voice recognition software, and may contain grammatical errors       Juan Roy, DO  PGY-2, Family Medicine

## 2024-02-26 DIAGNOSIS — E11.9 TYPE 2 DIABETES MELLITUS WITHOUT COMPLICATION, WITHOUT LONG-TERM CURRENT USE OF INSULIN (MULTI): ICD-10-CM

## 2024-02-26 RX ORDER — PEN NEEDLE, DIABETIC 30 GX3/16"
NEEDLE, DISPOSABLE MISCELLANEOUS
Qty: 100 EACH | Refills: 3 | Status: CANCELLED | OUTPATIENT
Start: 2024-02-26 | End: 2025-02-25

## 2024-02-26 RX ORDER — PEN NEEDLE, DIABETIC 30 GX3/16"
1 NEEDLE, DISPOSABLE MISCELLANEOUS SEE ADMIN INSTRUCTIONS
Qty: 100 EACH | Refills: 3 | Status: CANCELLED | OUTPATIENT
Start: 2024-02-26 | End: 2025-02-25

## 2024-02-26 RX ORDER — PEN NEEDLE, DIABETIC 30 GX3/16"
1 NEEDLE, DISPOSABLE MISCELLANEOUS DAILY PRN
Qty: 1 EACH | Refills: 11 | Status: SHIPPED | OUTPATIENT
Start: 2024-02-26

## 2024-02-26 RX ORDER — LANCETS 26 GAUGE
EACH MISCELLANEOUS
Qty: 1 EACH | Refills: 0 | Status: SHIPPED | OUTPATIENT
Start: 2024-02-26 | End: 2025-02-25

## 2024-02-26 NOTE — TELEPHONE ENCOUNTER
Pt called requesting refill of pen needles. Noted order sent to pharmacy today for autolet lancing device. Call placed to pt to clarify need of pen needles. Pt states provider switched pt from Basaglar to Lantus, and unsure if he will need pen needles for Lantus administration. Nurse called pharmacy for clarification. Pharmacist stated will be dispensed as a pen. Pt made aware. Refill request for pen needles pended and sent to provider.

## 2024-02-27 ENCOUNTER — CLINICAL SUPPORT (OUTPATIENT)
Dept: PRIMARY CARE | Facility: CLINIC | Age: 49
End: 2024-02-27
Payer: COMMERCIAL

## 2024-02-27 DIAGNOSIS — Z11.1 ENCOUNTER FOR TUBERCULIN SKIN TEST: ICD-10-CM

## 2024-02-27 RX ORDER — INSULIN GLARGINE 100 [IU]/ML
13 INJECTION, SOLUTION SUBCUTANEOUS NIGHTLY
Qty: 11.7 ML | Refills: 3 | Status: SHIPPED | OUTPATIENT
Start: 2024-02-27 | End: 2024-04-08 | Stop reason: DRUGHIGH

## 2024-02-27 NOTE — PROGRESS NOTES
Pt is in clinic to receive 1st step PPD placement for employment. Administered to RFA without event. Appt to read TB rescheduled for Friday d/t pt having previous engagement. Second step and read also scheduled.

## 2024-02-29 ENCOUNTER — APPOINTMENT (OUTPATIENT)
Dept: PRIMARY CARE | Facility: CLINIC | Age: 49
End: 2024-02-29
Payer: COMMERCIAL

## 2024-02-29 RX ORDER — INSULIN GLARGINE 100 [IU]/ML
13 INJECTION, SOLUTION SUBCUTANEOUS NIGHTLY
Qty: 11.7 ML | Refills: 3 | OUTPATIENT
Start: 2024-02-29 | End: 2025-02-28

## 2024-03-01 ENCOUNTER — CLINICAL SUPPORT (OUTPATIENT)
Dept: PRIMARY CARE | Facility: CLINIC | Age: 49
End: 2024-03-01
Payer: COMMERCIAL

## 2024-03-01 DIAGNOSIS — Z11.1 ENCOUNTER FOR READING OF TUBERCULIN SKIN TEST: ICD-10-CM

## 2024-03-01 LAB
INDURATION: 0 MM
TB SKIN TEST: NEGATIVE

## 2024-03-04 NOTE — PROGRESS NOTES
Juan Mackey  [id number]    Patient was discussed with resident Juan Roy DO and with attending Dr. Rios. Agreeable with plan as discussed.     DM2, uncontrolled with A1c of 13.5%.  Increase Lantus from 10 units to 13 units nightly.  Continue with metformin 1000 mg twice daily and Ozempic 2 mg weekly.  Start low-dose ACE inhibitor.  Discuss initiation of statin.    Prakash Mann MD  Family Medicine  PGY3

## 2024-03-05 ENCOUNTER — CLINICAL SUPPORT (OUTPATIENT)
Dept: PRIMARY CARE | Facility: CLINIC | Age: 49
End: 2024-03-05
Payer: COMMERCIAL

## 2024-03-05 DIAGNOSIS — Z11.1 ENCOUNTER FOR TUBERCULIN SKIN TEST: ICD-10-CM

## 2024-03-05 PROCEDURE — 86580 TB INTRADERMAL TEST: CPT | Performed by: EMERGENCY MEDICINE

## 2024-03-05 NOTE — PROGRESS NOTES
I reviewed the resident/fellow's documentation and discussed the patient with the resident/fellow. I agree with the resident/fellow's medical decision making as documented in the note.     Shannon Rios MD

## 2024-03-05 NOTE — PROGRESS NOTES
Pt in clinic to receive 2nd step PPD. Administered to LFA without event. Pt appt to read 2nd step previously scheduled. Will return March 7th 2024.

## 2024-03-07 ENCOUNTER — CLINICAL SUPPORT (OUTPATIENT)
Dept: PRIMARY CARE | Facility: CLINIC | Age: 49
End: 2024-03-07
Payer: COMMERCIAL

## 2024-03-07 DIAGNOSIS — Z11.1 ENCOUNTER FOR READING OF TUBERCULIN SKIN TEST: ICD-10-CM

## 2024-03-07 LAB
INDURATION: 0 MM
TB SKIN TEST: NEGATIVE

## 2024-03-07 NOTE — PROGRESS NOTES
Pt in clinic to have 2nd step PPD read. Result negative. 0.0mm induration. Documentation printed, and given to pt.

## 2024-03-21 DIAGNOSIS — E11.9 TYPE 2 DIABETES MELLITUS WITHOUT COMPLICATION, WITHOUT LONG-TERM CURRENT USE OF INSULIN (MULTI): ICD-10-CM

## 2024-03-28 ENCOUNTER — TELEMEDICINE (OUTPATIENT)
Dept: PRIMARY CARE | Facility: CLINIC | Age: 49
End: 2024-03-28
Payer: COMMERCIAL

## 2024-03-28 DIAGNOSIS — F41.9 ANXIETY: ICD-10-CM

## 2024-03-28 DIAGNOSIS — N52.9 ERECTILE DYSFUNCTION, UNSPECIFIED ERECTILE DYSFUNCTION TYPE: ICD-10-CM

## 2024-03-28 DIAGNOSIS — E11.9 TYPE 2 DIABETES MELLITUS WITHOUT COMPLICATION, WITHOUT LONG-TERM CURRENT USE OF INSULIN (MULTI): Primary | ICD-10-CM

## 2024-03-28 PROCEDURE — 3046F HEMOGLOBIN A1C LEVEL >9.0%: CPT

## 2024-03-28 PROCEDURE — 99213 OFFICE O/P EST LOW 20 MIN: CPT

## 2024-03-28 RX ORDER — SILDENAFIL 50 MG/1
50 TABLET, FILM COATED ORAL DAILY PRN
Qty: 60 TABLET | Refills: 3 | Status: SHIPPED | OUTPATIENT
Start: 2024-03-28 | End: 2024-11-23

## 2024-03-28 RX ORDER — LORAZEPAM 1 MG/1
1 TABLET ORAL AS NEEDED
Qty: 4 TABLET | Refills: 0 | Status: SHIPPED | OUTPATIENT
Start: 2024-03-28 | End: 2024-03-30

## 2024-03-28 RX ORDER — SILDENAFIL 50 MG/1
50 TABLET, FILM COATED ORAL AS NEEDED
COMMUNITY
Start: 2024-02-13 | End: 2024-03-28 | Stop reason: SDUPTHER

## 2024-03-28 ASSESSMENT — ENCOUNTER SYMPTOMS
UNEXPECTED WEIGHT CHANGE: 0
HEADACHES: 0
COUGH: 0
NAUSEA: 0
DIZZINESS: 0
PALPITATIONS: 0
SHORTNESS OF BREATH: 0
WHEEZING: 0
ABDOMINAL PAIN: 0
LIGHT-HEADEDNESS: 0
FATIGUE: 0
DIARRHEA: 0
FEVER: 0
VOMITING: 0

## 2024-03-28 NOTE — PROGRESS NOTES
"The patient verified their identity with their date of birth and verbally consented to evaluation and management of their condition through telemedicine. This visit was performed through Telemedicine during the COVID-19 Health Crisis during a state of National Emergency. This visit was a synchronous telemedicine service initiated by the patient using real-time audio-video two-way communication. The patient is aware that we will bill their insurance for this visit following Medicare and Private/Commercial Payor guidelines, but they may be responsible for some or all of the visit charges if their insurance deems this \"non-covered.\"    Subjective   Patient ID: Juan Mackey is a 48 y.o. male who presents for Diabetes.    HPI  #DM2  - A1C 13.5 (Feb 2024)  - Increased lantus to 13U nightly at previous visit  - freestyle juno: fasting 151-189 this am. Reports eating sweets overnight.  -  with [facility]---> checks BG, make sure exercise, DM education  - takes metformin 1000mg BID, ozempic 2mg  weekly   - Lisinopril 2.5mg for added renal protection    #flight anxiety  #Motion sickness   :: plan to go to [facility] ( 7 hour flight)  -plan for low-dose Ativan prescription of 1 mg as needed for total of 2 doses for each flight. Tolerated well with previous flight  -ordered scopolamine at previous visit    #ED  - requesting refill Viagra 50mg prn     Review of Systems   Constitutional:  Negative for fatigue, fever and unexpected weight change.   Respiratory:  Negative for cough, shortness of breath and wheezing.    Cardiovascular:  Negative for chest pain, palpitations and leg swelling.   Gastrointestinal:  Negative for abdominal pain, diarrhea, nausea and vomiting.   Neurological:  Negative for dizziness, light-headedness and headaches.       Previous history  No past medical history on file.  Past Surgical History:   Procedure Laterality Date    OTHER SURGICAL HISTORY  08/28/2020    Appendectomy    OTHER SURGICAL " "HISTORY  07/19/2022    Corneal lasik     Social History     Tobacco Use    Smoking status: Never     Passive exposure: Never    Smokeless tobacco: Never   Substance Use Topics    Alcohol use: Never    Drug use: Never     No family history on file.  Allergies   Allergen Reactions    Shrimp Angioedema    Oxycodone Dizziness     Current Outpatient Medications   Medication Instructions    Autolet lancing device Use as instructed    Basaglar KwikPen U-100 Insulin 13 Units, subcutaneous, Nightly, Take as directed per insulin instructions.    insulin glargine (LANTUS) 13 Units, subcutaneous, Nightly, Take as directed per insulin instructions    insulin syringe,safetyneedle 29G X 1/2\" 0.5 mL syringe Use to inject 1-4 times daily as directed.    levothyroxine (SYNTHROID, LEVOXYL) 150 mcg, oral, Daily before breakfast    lisinopril 2.5 mg, oral, Daily    LORazepam (ATIVAN) 1 mg, oral, As needed, 1 mg as needed for total of 2 doses for each flight    metFORMIN (GLUCOPHAGE) 1,000 mg, oral, 2 times daily with meals    pen needle, diabetic 32 gauge x 5/32\" needle 1 Box, subcutaneous, Daily PRN    scopolamine (Transderm-Scop) 1 mg over 3 days patch 3 day 1 patch, transdermal, Every 72 hours PRN    semaglutide 2 mg, subcutaneous, Weekly    sildenafil (VIAGRA) 50 mg, oral, Daily PRN, 50 mg once daily as needed 1 hour before sexual activity. May increase to a maximum dose of 100 mg once daily if there is incomplete response.       Objective       Physical Exam  Psychiatric:         Mood and Affect: Mood normal.         Behavior: Behavior normal.         Judgment: Judgment normal.     Assessment/Plan   Juan Mackey is a 48 y.o. male who presents for the concerns below:    Problem List Items Addressed This Visit    None  Visit Diagnoses       Type 2 diabetes mellitus without complication, without long-term current use of insulin (CMS/[facility])    -  Primary    Relevant Orders    Hemoglobin A1c    Albumin , Urine Random    Lipid Panel    " Erectile dysfunction, unspecified erectile dysfunction type        Relevant Medications    sildenafil (Viagra) 50 mg tablet    Anxiety        Relevant Medications    LORazepam (Ativan) 1 mg tablet          #DM2  - A1C 13.5 (Feb 2024)  - Plan to increase lantus frrom 13U to 15U nightly as fasting blood glucose currently above goal ()  - C/w  service through [facility]  - continue Lisinopril 2.5mg for added renal protection  - c/w metformin 1000mg BID, ozempic 2mg  weekly  - trial increase then consult endocrinolgy if no improvement  - May 17th-> A1c, lipid panel, protein/cr     #flight anxiety  #Motion sickness   :: plan to go to [facility] ( 7 hour flight)  -low-dose Ativan prescription of 1 mg as needed for total of 2 doses for each flight   -has scopolamine patch available     #ED  - reordered Viagra 50mg prn        Discussed with:  Dr. Rios  Return in : Early June 2024    Portions of this note were generated using digital voice recognition software, and may contain grammatical errors       Juan Roy, DO  PGY-2, Family Medicine

## 2024-04-08 DIAGNOSIS — E11.9 TYPE 2 DIABETES MELLITUS WITHOUT COMPLICATION, WITHOUT LONG-TERM CURRENT USE OF INSULIN (MULTI): ICD-10-CM

## 2024-04-08 RX ORDER — INSULIN GLARGINE 100 [IU]/ML
15 INJECTION, SOLUTION SUBCUTANEOUS NIGHTLY
Qty: 13.5 ML | Refills: 3
Start: 2024-04-08 | End: 2024-05-06 | Stop reason: ALTCHOICE

## 2024-04-08 RX ORDER — INSULIN GLARGINE 100 [IU]/ML
15 INJECTION, SOLUTION SUBCUTANEOUS NIGHTLY
Qty: 13.5 ML | Refills: 3
Start: 2024-04-08 | End: 2024-04-08 | Stop reason: WASHOUT

## 2024-04-16 ENCOUNTER — OFFICE VISIT (OUTPATIENT)
Dept: PODIATRY | Facility: CLINIC | Age: 49
End: 2024-04-16
Payer: COMMERCIAL

## 2024-04-16 DIAGNOSIS — B35.3 TINEA PEDIS OF BOTH FEET: ICD-10-CM

## 2024-04-16 DIAGNOSIS — Z79.4 TYPE 2 DIABETES MELLITUS WITHOUT COMPLICATION, WITH LONG-TERM CURRENT USE OF INSULIN (MULTI): Primary | ICD-10-CM

## 2024-04-16 DIAGNOSIS — E11.9 TYPE 2 DIABETES MELLITUS WITHOUT COMPLICATION, WITH LONG-TERM CURRENT USE OF INSULIN (MULTI): Primary | ICD-10-CM

## 2024-04-16 DIAGNOSIS — B35.1 ONYCHOMYCOSIS: ICD-10-CM

## 2024-04-16 PROCEDURE — 3046F HEMOGLOBIN A1C LEVEL >9.0%: CPT | Performed by: PODIATRIST

## 2024-04-16 PROCEDURE — 99213 OFFICE O/P EST LOW 20 MIN: CPT | Performed by: PODIATRIST

## 2024-04-16 RX ORDER — ECONAZOLE NITRATE 10 MG/G
CREAM TOPICAL
Qty: 30 G | Refills: 11 | Status: SHIPPED | OUTPATIENT
Start: 2024-04-16 | End: 2025-04-16

## 2024-04-16 RX ORDER — CICLOPIROX 80 MG/ML
SOLUTION TOPICAL NIGHTLY
Qty: 6.6 ML | Refills: 3 | Status: SHIPPED | OUTPATIENT
Start: 2024-04-16

## 2024-04-16 NOTE — PROGRESS NOTES
"History Of Present Illness  Juan Mackey is a 48 y.o. male presenting for transition of care: diabatic foot care. Patient complains with painful elongated nails.  Hemoglobin A1c has been 14.  Patient is working very hard to decrease his number.  Patient has had no problems with his feet.  Patient works on his feet all day.  Past Medical History  He has no past medical history on file.    Surgical History  He has a past surgical history that includes Other surgical history (08/28/2020) and Other surgical history (07/19/2022).     Social History  He reports that he has never smoked. He has never been exposed to tobacco smoke. He has never used smokeless tobacco. He reports that he does not drink alcohol and does not use drugs.    Family History  No family history on file.     Allergies  Shrimp and Oxycodone    Medications  Current Outpatient Medications   Medication Sig Dispense Refill    Autolet lancing device Use as instructed 1 each 0    insulin glargine (Lantus) 100 unit/mL injection Inject 15 Units under the skin once daily at bedtime. Take as directed per insulin instructions 13.5 mL 3    insulin syringe,safetyneedle 29G X 1/2\" 0.5 mL syringe Use to inject 1-4 times daily as directed. 100 each 11    levothyroxine (Synthroid, Levoxyl) 150 mcg tablet Take 1 tablet (150 mcg) by mouth once daily in the morning. Take before meals.      metFORMIN (Glucophage) 1,000 mg tablet Take 1 tablet (1,000 mg) by mouth 2 times a day with meals. 180 tablet 0    pen needle, diabetic 32 gauge x 5/32\" needle Inject 1 Box under the skin once daily as needed (bg monitoring). 1 each 11    scopolamine (Transderm-Scop) 1 mg over 3 days patch 3 day Place 1 patch over 72 hours on the skin every 3rd day if needed (nausea). 10 patch 0    semaglutide 2 mg/dose (8 mg/3 mL) pen injector Inject 2 mg under the skin 1 (one) time per week. 12 mL 2    sildenafil (Viagra) 50 mg tablet Take 1 tablet (50 mg) by mouth once daily as needed for erectile " dysfunction. 50 mg once daily as needed 1 hour before sexual activity. May increase to a maximum dose of 100 mg once daily if there is incomplete response. 60 tablet 3    lisinopril 2.5 mg tablet Take 1 tablet (2.5 mg) by mouth once daily. 30 tablet 5    LORazepam (Ativan) 1 mg tablet Take 1 tablet (1 mg) by mouth if needed for anxiety for up to 2 days. 1 mg as needed for total of 2 doses for each flight 4 tablet 0     No current facility-administered medications for this visit.       Review of Systems    REVIEW OF SYSTEMS  GENERAL:  Negative for malaise, significant weight loss, fever  CARDIOVASCULAR: leg swelling   MUSCULOSKELETAL:  Negative for joint pain or swelling, back pain, and muscle pain.  SKIN:  Negative for lesions, rash, and itching  PSYCH:  Negative for sleep disturbance, mood disorder and recent psychosocial stressors  NEURO: Negative, denies any burning, tingling or numbness     Objective:   Vasc: DP and PT pulses are palpable bilateral.  CFT is less than 3 seconds bilateral.  Skin temperature is warm to cool proximal to distal bilateral.  No pedal edema is appreciated.    Neuro:  Light touch is intact to the foot bilateral.  Protective sensation is intact to the foot when tested with the 5.07 SWM bilateral.  There is no clonus noted.  The hallux is downgoing bilateral.      Derm: Nails 1-5 bilateral are thickened, elongated and crumbly with subungual debris. Skin is supple with normal texture and turgor noted.  Patient has diffuse tinea present there are no hyperkeratoses, ulcerations, verruca or other lesions noted.      Ortho: Muscle strength is 5/5 for all pedal groups tested.  Ankle joint, subtalar joint, 1st MPJ and lesser MPJ ROM is full and without pain or crepitus.  The foot type is rectus bilateral off weight bearing.  There are no structural deformities noted.    Assessment/Plan     Diagnoses and all orders for this visit:  Type 2 diabetes mellitus without complication, with long-term  current use of insulin (Multi)  Onychomycosis  Tinea pedis of both feet      We discussed the need for decreasing blood sugar.  Patient is very committed to leading a healthier lifestyle.  We will start patient on a topical antifungal for his tinea.  Patient will be started on Ciclodan for his toenail fungus.           Teresa Nieves, CMA

## 2024-04-17 ENCOUNTER — APPOINTMENT (OUTPATIENT)
Dept: ENDOCRINOLOGY | Facility: CLINIC | Age: 49
End: 2024-04-17
Payer: COMMERCIAL

## 2024-04-17 ENCOUNTER — OFFICE VISIT (OUTPATIENT)
Dept: ENDOCRINOLOGY | Facility: CLINIC | Age: 49
End: 2024-04-17
Payer: COMMERCIAL

## 2024-04-17 VITALS
HEIGHT: 67 IN | WEIGHT: 205 LBS | SYSTOLIC BLOOD PRESSURE: 120 MMHG | DIASTOLIC BLOOD PRESSURE: 78 MMHG | BODY MASS INDEX: 32.18 KG/M2 | HEART RATE: 103 BPM

## 2024-04-17 DIAGNOSIS — E89.0 POSTOPERATIVE HYPOTHYROIDISM: Primary | ICD-10-CM

## 2024-04-17 PROCEDURE — 99214 OFFICE O/P EST MOD 30 MIN: CPT | Performed by: INTERNAL MEDICINE

## 2024-04-17 PROCEDURE — 1036F TOBACCO NON-USER: CPT | Performed by: INTERNAL MEDICINE

## 2024-04-17 RX ORDER — LEVOTHYROXINE SODIUM 150 UG/1
150 TABLET ORAL
Qty: 90 TABLET | Refills: 3 | Status: SHIPPED | OUTPATIENT
Start: 2024-04-17 | End: 2024-04-17 | Stop reason: SDUPTHER

## 2024-04-17 RX ORDER — LEVOTHYROXINE SODIUM 150 UG/1
150 TABLET ORAL
Qty: 90 TABLET | Refills: 3 | Status: SHIPPED | OUTPATIENT
Start: 2024-04-17 | End: 2024-04-19 | Stop reason: SDUPTHER

## 2024-04-17 ASSESSMENT — PAIN SCALES - GENERAL: PAINLEVEL: 0-NO PAIN

## 2024-04-17 NOTE — PROGRESS NOTES
I saw and evaluated the patient. I personally obtained the key and critical portions of the history and physical exam or was physically present for key and critical portions performed by the resident/fellow. I reviewed the resident/fellow's documentation and discussed the patient with the resident/fellow. I agree with the resident/fellow's medical decision making as documented in the note.    Sana Arreguin MD

## 2024-04-17 NOTE — PROGRESS NOTES
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      Background History:  Endocrine was initially consulted due to abnormal findings on physical exam showing some neck enlargement prompting an thyroid ultrasound that showed, thyroid enlargement, with multiple nodules, dominant nodule on the right thyroid lobe measuring 4.6 x 2.7 x 5 cm. and other multiple nodules on the left thyroid lobe Labs were done in July 2020 showing negative TPO antibodies and TSH of 0.9. Clinically and biochemically euthyroid. On 8/13.20 [facility] thyroid biopsy and pathology showed benign follicular nodule. He underwent total thyroidectomy in Nov 2020 and follicular nodular disease with small incidental 0.05 cm PTC in left lobe on pathology.     He was last seen by Endocrine on 4/14/23.   At the time, he was maintained on LT4 of 150 mcg orally daily - taken adequately. He otherwise felt well denying symptoms of hyper or hypothyroidism including; palpitations, chest pain, excessive sweating, weight changes - at  the time- eye changes, or skin changes. Energy and sleep were and remain good. No dysphasia, compressive sx. shortness of breath, hoarseness. His weight is currently 205 pounds - seems to be higher than the usual of 194 pounds.     His Diabetes is essentially managed by Family Medicine - when last seen, based on increase in A1C > 14 - 13.5, in correlation with FL 3 trend, his Basaglar was increased from 10 units to 15 units at bedtime - 2 weeks ptp. His Metformin remains as 1000 mg orally BID.  2 months ptp, pt. Was started on Ozempic - he is on the 2 mg/week dose. Trulicity is discontinued - 4.5 mg/week. Patient sees Podiatry for Peripheral Neuropathy. No retinopathy - upcoming visit with  Ophthalmology in the next few months.   Diet: Recently started  "Intermittent Fasting - Fasting Interval 7pm to 9 AM. + Portion Control. Calorie Counts- 2000 calories/day. Does not Carb Count. + Salads. + proteins.   Beverages: No EtOH, No Sodas, No Juices.   Snacks: Cashews - Nuts - no sweets.  Exercise: 5,000 steps/day - monitors.   The lifestyle modification is recent in onset - about a month.   Unable to Share FL3 Data with us today. Wishes to defer DM management to Family Medicine.   10 pt ROS reviewed and is otherwise + for diarrhea as he does not consistently take metformin with food.   Vitals:    04/17/24 0919   BP: 120/78   Pulse: 103           Constitutional: NAD, well groomed. CCO3.   Skin/Hair: Acanthosis Nigricans over posterior neck   HEENT: EOMI, Anicteric scleras, No lid lag or lid retraction, No TTP of ocular globes. Dry oral mucosa. Chvostek sign positive b/l   Neck: Soft, supple. Non tender, full ROM, no lymphadenopathy. Thyroid gland not palpated. Surgical , scar present.  Cardiovascular: RRR, no rub or murmurs.  Respiratory: no accessory muscle use.  Abdomen: +BS, + Subcutaneous Fat, Soft, Nt, No HSM   Extremities: Preserved peripheral pulses, No peripheral edema.  Neuro: Moving all extremities spontaneously. CN's grossly intact. No balance or gait disturbances. DTRs: no delay in relaxation phase.                  Current Outpatient Medications:     Autolet lancing device, Use as instructed, Disp: 1 each, Rfl: 0    ciclopirox (Ciclodan) 8 % solution, Apply topically once daily at bedtime. Remove weekly with rubbing alcohol., Disp: 6.6 mL, Rfl: 3    insulin glargine (Lantus) 100 unit/mL injection, Inject 15 Units under the skin once daily at bedtime. Take as directed per insulin instructions, Disp: 13.5 mL, Rfl: 3    insulin syringe,safetyneedle 29G X 1/2\" 0.5 mL syringe, Use to inject 1-4 times daily as directed., Disp: 100 each, Rfl: 11    levothyroxine (Synthroid, Levoxyl) 150 mcg tablet, Take 1 tablet (150 mcg) by mouth once daily in the morning. Take " "before meals., Disp: , Rfl:     metFORMIN (Glucophage) 1,000 mg tablet, Take 1 tablet (1,000 mg) by mouth 2 times a day with meals., Disp: 180 tablet, Rfl: 0    pen needle, diabetic 32 gauge x 5/32\" needle, Inject 1 Box under the skin once daily as needed (bg monitoring)., Disp: 1 each, Rfl: 11    scopolamine (Transderm-Scop) 1 mg over 3 days patch 3 day, Place 1 patch over 72 hours on the skin every 3rd day if needed (nausea)., Disp: 10 patch, Rfl: 0    semaglutide 2 mg/dose (8 mg/3 mL) pen injector, Inject 2 mg under the skin 1 (one) time per week., Disp: 12 mL, Rfl: 2    sildenafil (Viagra) 50 mg tablet, Take 1 tablet (50 mg) by mouth once daily as needed for erectile dysfunction. 50 mg once daily as needed 1 hour before sexual activity. May increase to a maximum dose of 100 mg once daily if there is incomplete response., Disp: 60 tablet, Rfl: 3    econazole nitrate 1 % cream, Apply between toes and to the bottom of feet twice a day (Patient not taking: Reported on 4/17/2024), Disp: 30 g, Rfl: 11    lisinopril 2.5 mg tablet, Take 1 tablet (2.5 mg) by mouth once daily., Disp: 30 tablet, Rfl: 5    LORazepam (Ativan) 1 mg tablet, Take 1 tablet (1 mg) by mouth if needed for anxiety for up to 2 days. 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   Recommendations provided to start taking Vitamin D of 1000 units daily. Refills provided for Levothyroxine 150 mcg orally daily.   2.  C/W: Ozempic injections 2mg/wk                 Metformin 1000 mg orally BID WITH MEALS                Basaglar 15 units at bedtime.       C/W Lisinopril of " 2.5 mg orally daily   Today, reiterated the importance of resuming lifestyle modification - appears to be on the right track.     RTC annually.   Patient is discussed, seen, and examined with Dr. Arreguin who agrees with the management plan.

## 2024-04-19 DIAGNOSIS — E89.0 POSTOPERATIVE HYPOTHYROIDISM: ICD-10-CM

## 2024-04-19 RX ORDER — LEVOTHYROXINE SODIUM 150 UG/1
150 TABLET ORAL
Qty: 90 TABLET | Refills: 3 | Status: SHIPPED | OUTPATIENT
Start: 2024-04-19

## 2024-04-23 ENCOUNTER — TELEPHONE (OUTPATIENT)
Dept: PODIATRY | Facility: CLINIC | Age: 49
End: 2024-04-23

## 2024-04-23 NOTE — TELEPHONE ENCOUNTER
A pharmacist called and left a message regarding an appeal for a topical that was ordered for Juan.  They need some clinical information.  [phone]  ext [phone]  Thank you

## 2024-05-06 DIAGNOSIS — E11.9 TYPE 2 DIABETES MELLITUS WITHOUT COMPLICATION, WITHOUT LONG-TERM CURRENT USE OF INSULIN (MULTI): Primary | ICD-10-CM

## 2024-05-06 RX ORDER — INSULIN GLARGINE 100 [IU]/ML
15 INJECTION, SOLUTION SUBCUTANEOUS NIGHTLY
Qty: 3 ML | Refills: 12 | Status: SHIPPED | OUTPATIENT
Start: 2024-05-06 | End: 2024-05-22

## 2024-05-06 NOTE — TELEPHONE ENCOUNTER
Pt called for refill of Lantus pen to the [facility] on [facility]. Message sent to the provider r/t need.

## 2024-05-22 DIAGNOSIS — E11.9 TYPE 2 DIABETES MELLITUS WITHOUT COMPLICATION, WITHOUT LONG-TERM CURRENT USE OF INSULIN (MULTI): ICD-10-CM

## 2024-05-22 RX ORDER — INSULIN GLARGINE 100 [IU]/ML
15 INJECTION, SOLUTION SUBCUTANEOUS NIGHTLY
Qty: 3 ML | Refills: 12 | Status: SHIPPED | OUTPATIENT
Start: 2024-05-22 | End: 2025-05-22

## 2024-05-22 RX ORDER — LISINOPRIL 2.5 MG/1
2.5 TABLET ORAL DAILY
Qty: 100 TABLET | Refills: 3 | Status: SHIPPED | OUTPATIENT
Start: 2024-05-22 | End: 2025-06-26

## 2024-05-22 RX ORDER — METFORMIN HYDROCHLORIDE 1000 MG/1
1000 TABLET ORAL
Qty: 180 TABLET | Refills: 0 | Status: SHIPPED | OUTPATIENT
Start: 2024-05-22

## 2024-05-25 ENCOUNTER — LAB (OUTPATIENT)
Dept: LAB | Facility: LAB | Age: 49
End: 2024-05-25
Payer: COMMERCIAL

## 2024-05-25 DIAGNOSIS — E11.9 TYPE 2 DIABETES MELLITUS WITHOUT COMPLICATION, WITHOUT LONG-TERM CURRENT USE OF INSULIN (MULTI): ICD-10-CM

## 2024-05-25 LAB
CHOLEST SERPL-MCNC: 199 MG/DL (ref 0–199)
CHOLESTEROL/HDL RATIO: 4.9
CREAT UR-MCNC: 170.5 MG/DL (ref 20–370)
EST. AVERAGE GLUCOSE BLD GHB EST-MCNC: 280 MG/DL
HBA1C MFR BLD: 11.4 %
HDLC SERPL-MCNC: 40.9 MG/DL
LDLC SERPL CALC-MCNC: 136 MG/DL
MICROALBUMIN UR-MCNC: 8.2 MG/L
MICROALBUMIN/CREAT UR: 4.8 UG/MG CREAT
NON HDL CHOLESTEROL: 158 MG/DL (ref 0–149)
TRIGL SERPL-MCNC: 113 MG/DL (ref 0–149)
VLDL: 23 MG/DL (ref 0–40)

## 2024-05-25 PROCEDURE — 80061 LIPID PANEL: CPT

## 2024-05-25 PROCEDURE — 36415 COLL VENOUS BLD VENIPUNCTURE: CPT

## 2024-05-25 PROCEDURE — 82043 UR ALBUMIN QUANTITATIVE: CPT

## 2024-05-25 PROCEDURE — 83036 HEMOGLOBIN GLYCOSYLATED A1C: CPT

## 2024-05-25 PROCEDURE — 82570 ASSAY OF URINE CREATININE: CPT

## 2024-06-07 ENCOUNTER — APPOINTMENT (OUTPATIENT)
Dept: PRIMARY CARE | Facility: CLINIC | Age: 49
End: 2024-06-07
Payer: COMMERCIAL

## 2024-06-13 ENCOUNTER — APPOINTMENT (OUTPATIENT)
Dept: PRIMARY CARE | Facility: CLINIC | Age: 49
End: 2024-06-13
Payer: COMMERCIAL

## 2024-06-13 VITALS
HEART RATE: 101 BPM | WEIGHT: 205 LBS | OXYGEN SATURATION: 95 % | DIASTOLIC BLOOD PRESSURE: 78 MMHG | HEIGHT: 67 IN | BODY MASS INDEX: 32.18 KG/M2 | SYSTOLIC BLOOD PRESSURE: 122 MMHG | TEMPERATURE: 96.2 F

## 2024-06-13 DIAGNOSIS — E11.9 TYPE 2 DIABETES MELLITUS WITHOUT COMPLICATION, WITHOUT LONG-TERM CURRENT USE OF INSULIN (MULTI): ICD-10-CM

## 2024-06-13 PROCEDURE — 3078F DIAST BP <80 MM HG: CPT

## 2024-06-13 PROCEDURE — 99213 OFFICE O/P EST LOW 20 MIN: CPT

## 2024-06-13 PROCEDURE — 3046F HEMOGLOBIN A1C LEVEL >9.0%: CPT

## 2024-06-13 PROCEDURE — 3074F SYST BP LT 130 MM HG: CPT

## 2024-06-13 PROCEDURE — 3050F LDL-C >= 130 MG/DL: CPT

## 2024-06-13 PROCEDURE — 3061F NEG MICROALBUMINURIA REV: CPT

## 2024-06-13 PROCEDURE — 4010F ACE/ARB THERAPY RXD/TAKEN: CPT

## 2024-06-13 ASSESSMENT — PATIENT HEALTH QUESTIONNAIRE - PHQ9
SUM OF ALL RESPONSES TO PHQ9 QUESTIONS 1 AND 2: 0
2. FEELING DOWN, DEPRESSED OR HOPELESS: NOT AT ALL
1. LITTLE INTEREST OR PLEASURE IN DOING THINGS: NOT AT ALL

## 2024-06-13 ASSESSMENT — PAIN SCALES - GENERAL: PAINLEVEL: 0-NO PAIN

## 2024-06-13 ASSESSMENT — ENCOUNTER SYMPTOMS: DEPRESSION: 0

## 2024-06-13 NOTE — PROGRESS NOTES
"Subjective   Patient ID: Juan Mackey is a 48 y.o. male who presents for Follow-up.    HPI    #DM2  #hyperlipidemia  - has been without insulin for 1 month April- May  -started back again early June  - has podiatrist   - to follow up with ophthmolgy   - A1C 13.5 (Feb 2024) improved to 11.4 (May 2024  - C/w  service through [facility] (call to make sure eating well, portion control,   - c/w metformin 1000mg BID, ozempic 2mg  weekly, lisinopril 2.5mg every day     Review of Systems   Constitutional:  Negative for fatigue, fever and unexpected weight change.   Respiratory:  Negative for cough, shortness of breath and wheezing.    Cardiovascular:  Negative for chest pain, palpitations and leg swelling.   Gastrointestinal:  Negative for abdominal pain, diarrhea, nausea and vomiting.   Neurological:  Negative for dizziness, light-headedness and headaches.     Previous history  No past medical history on file.  Past Surgical History:   Procedure Laterality Date    OTHER SURGICAL HISTORY  08/28/2020    Appendectomy    OTHER SURGICAL HISTORY  07/19/2022    Corneal lasik     Social History     Tobacco Use    Smoking status: Never     Passive exposure: Never    Smokeless tobacco: Never   Substance Use Topics    Alcohol use: Yes    Drug use: Never     No family history on file.  Allergies   Allergen Reactions    Shrimp Angioedema    Oxycodone Dizziness     Current Outpatient Medications   Medication Instructions    Autolet lancing device Use as instructed    ciclopirox (Ciclodan) 8 % solution Topical, Nightly, Remove weekly with rubbing alcohol.    econazole nitrate 1 % cream Apply between toes and to the bottom of feet twice a day    insulin syringe,safetyneedle 29G X 1/2\" 0.5 mL syringe Use to inject 1-4 times daily as directed.    Lantus Solostar U-100 Insulin 15 Units, subcutaneous, Nightly, Take as directed per insulin instructions.    levothyroxine (SYNTHROID, LEVOXYL) 150 mcg, oral, Daily before breakfast    " "lisinopril 2.5 mg, oral, Daily    metFORMIN (GLUCOPHAGE) 1,000 mg, oral, 2 times daily (morning and late afternoon)    pen needle, diabetic 32 gauge x 5/32\" needle 1 Box, subcutaneous, Daily PRN    semaglutide 2 mg, subcutaneous, Once Weekly    sildenafil (VIAGRA) 50 mg, oral, Daily PRN, 50 mg once daily as needed 1 hour before sexual activity. May increase to a maximum dose of 100 mg once daily if there is incomplete response.       Objective       Physical Exam  HENT:      Head: Normocephalic and atraumatic.   Eyes:      General: No scleral icterus.     Conjunctiva/sclera: Conjunctivae normal.   Cardiovascular:      Rate and Rhythm: Normal rate and regular rhythm.      Heart sounds: No murmur heard.     No friction rub. No gallop.   Pulmonary:      Effort: Pulmonary effort is normal. No respiratory distress.      Breath sounds: Normal breath sounds.   Abdominal:      General: There is no distension.      Palpations: Abdomen is soft.      Tenderness: There is no abdominal tenderness.   Musculoskeletal:         General: Normal range of motion.   Skin:     General: Skin is warm and dry.   Neurological:      General: No focal deficit present.      Mental Status: He is alert and oriented to person, place, and time.   Psychiatric:         Mood and Affect: Mood normal.           Assessment/Plan   Juan Mackey is a 48 y.o. male who presents for the concerns below:    Problem List Items Addressed This Visit    None  Visit Diagnoses       Type 2 diabetes mellitus without complication, without long-term current use of insulin (Multi)               #DM2  #hyperlipidemia  - obtain A1c. In 3 months  - order fasting lipid panel   - A1C 13.5 (Feb 2024) improved to 11.4 (May 2024  - C/w  service through [facility] (call to make sure eating well, portion control,   - c/w metformin 1000mg BID, ozempic 2mg  weekly, lisinopril 2.5mg every day     Discussed with:  Dr. Roldan  Return in : 3 months    Portions of this note were " generated using digital voice recognition software, and may contain grammatical errors       Juan Roy, DO  PGY-2, Family Medicine

## 2024-06-17 ASSESSMENT — ENCOUNTER SYMPTOMS
LIGHT-HEADEDNESS: 0
COUGH: 0
NAUSEA: 0
FATIGUE: 0
FEVER: 0
UNEXPECTED WEIGHT CHANGE: 0
ABDOMINAL PAIN: 0
HEADACHES: 0
DIZZINESS: 0
VOMITING: 0
WHEEZING: 0
PALPITATIONS: 0
SHORTNESS OF BREATH: 0
DIARRHEA: 0

## 2024-08-09 DIAGNOSIS — E11.9 TYPE 2 DIABETES MELLITUS WITHOUT COMPLICATION, WITHOUT LONG-TERM CURRENT USE OF INSULIN (MULTI): ICD-10-CM

## 2024-08-09 RX ORDER — METFORMIN HYDROCHLORIDE 1000 MG/1
1000 TABLET ORAL
Qty: 180 TABLET | Refills: 3 | Status: SHIPPED | OUTPATIENT
Start: 2024-08-09 | End: 2025-08-09

## 2024-08-22 ASSESSMENT — ENCOUNTER SYMPTOMS
CARDIOVASCULAR NEGATIVE: 1
NEUROLOGICAL NEGATIVE: 1
CONSTITUTIONAL NEGATIVE: 1
PSYCHIATRIC NEGATIVE: 1
RESPIRATORY NEGATIVE: 1

## 2024-08-23 ENCOUNTER — APPOINTMENT (OUTPATIENT)
Dept: SLEEP MEDICINE | Facility: CLINIC | Age: 49
End: 2024-08-23
Payer: COMMERCIAL

## 2024-08-23 VITALS
DIASTOLIC BLOOD PRESSURE: 77 MMHG | SYSTOLIC BLOOD PRESSURE: 118 MMHG | HEART RATE: 84 BPM | TEMPERATURE: 97.6 F | WEIGHT: 205.6 LBS | BODY MASS INDEX: 32.27 KG/M2 | HEIGHT: 67 IN

## 2024-08-23 DIAGNOSIS — G47.33 OSA (OBSTRUCTIVE SLEEP APNEA): Primary | ICD-10-CM

## 2024-08-23 PROCEDURE — 1036F TOBACCO NON-USER: CPT | Performed by: STUDENT IN AN ORGANIZED HEALTH CARE EDUCATION/TRAINING PROGRAM

## 2024-08-23 PROCEDURE — 3008F BODY MASS INDEX DOCD: CPT | Performed by: STUDENT IN AN ORGANIZED HEALTH CARE EDUCATION/TRAINING PROGRAM

## 2024-08-23 PROCEDURE — 99214 OFFICE O/P EST MOD 30 MIN: CPT | Performed by: STUDENT IN AN ORGANIZED HEALTH CARE EDUCATION/TRAINING PROGRAM

## 2024-08-23 PROCEDURE — G2211 COMPLEX E/M VISIT ADD ON: HCPCS | Performed by: STUDENT IN AN ORGANIZED HEALTH CARE EDUCATION/TRAINING PROGRAM

## 2024-08-23 ASSESSMENT — SLEEP AND FATIGUE QUESTIONNAIRES
HOW LIKELY ARE YOU TO NOD OFF OR FALL ASLEEP WHEN YOU ARE A PASSENGER IN A CAR FOR AN HOUR WITHOUT A BREAK: WOULD NEVER DOZE
WORRIED_DISTRESSED_DUE_TO_SLEEP: A LITTLE
HOW LIKELY ARE YOU TO NOD OFF OR FALL ASLEEP WHILE WATCHING TV: SLIGHT CHANCE OF DOZING
ESS-CHAD TOTAL SCORE: 8
HOW LIKELY ARE YOU TO NOD OFF OR FALL ASLEEP WHILE SITTING AND READING: HIGH CHANCE OF DOZING
SLEEP_PROBLEM_NOTICEABLE_TO_OTHERS: A LITTLE
SLEEP_PROBLEM_INTERFERES_DAILY_ACTIVITIES: A LITTLE
HOW LIKELY ARE YOU TO NOD OFF OR FALL ASLEEP IN A CAR, WHILE STOPPED FOR A FEW MINUTES IN TRAFFIC: WOULD NEVER DOZE
WAKING_TOO_EARLY: MILD
SITING INACTIVE IN A PUBLIC PLACE LIKE A CLASS ROOM OR A MOVIE THEATER: SLIGHT CHANCE OF DOZING
SATISFACTION_WITH_CURRENT_SLEEP_PATTERN: SATISFIED
HOW LIKELY ARE YOU TO NOD OFF OR FALL ASLEEP WHILE SITTING QUIETLY AFTER LUNCH WITHOUT ALCOHOL: WOULD NEVER DOZE
HOW LIKELY ARE YOU TO NOD OFF OR FALL ASLEEP WHILE SITTING AND TALKING TO SOMEONE: WOULD NEVER DOZE
HOW LIKELY ARE YOU TO NOD OFF OR FALL ASLEEP WHILE LYING DOWN TO REST IN THE AFTERNOON WHEN CIRCUMSTANCES PERMIT: HIGH CHANCE OF DOZING
DIFFICULTY_FALLING_ASLEEP: MILD

## 2024-08-23 NOTE — ASSESSMENT & PLAN NOTE
- doing well with PAP therapy  - patient reports that he has been doing well on PAP therapy.  Wife remains very happy with the resolution of snoring.  He however did have one question regarding nasal passage being dry.  We found out that his humidity was set to off.  We were able to change it to level 4 and instructed him to adjust as needed.   - continue current setting  - renew PAP supply orders

## 2024-08-23 NOTE — ASSESSMENT & PLAN NOTE
BMI Readings from Last 1 Encounters:   08/23/24 32.20 kg/m²     - encouraged healthy weight loss via diet and exercise  - He was working on weight loss and making progress but have regressed a bit this year.  He is reminded again to keep working on it.

## 2024-08-23 NOTE — PROGRESS NOTES
Patient: Juan Mackey    [id number]  : 1975 -- AGE 48 y.o.    Provider: Car Neumann MD     Location [facility]   Service Date: 2024              [facility]  Followup Visit Note    HISTORY OF PRESENT ILLNESS     HISTORY OF PRESENT ILLNESS   Juan Mackey is a 48 y.o. male with h/o ANAI and hypothyroidism, diabetes and seasonal allergies  who presents to a [facility] for followup.     Assessment and plan from last visit: 2023    47 year male with h/o hypothyroidism, diabetes and seasonal allergies who presents for a follow-up visit     # ANAI  - continues to do very well with his CPAP.  - great compliance and symptom improvement  - he reported that PAP therapy has been a life-saver. He has night and day difference since he has been on CPAP  - wife is very happy that he is no longer snoring  - continue CPAP at 5-10 cwp.  - renew supply order     # Obesity  - BMI 31 (down from 33)  - working on weight loss (he was switched from Trulicity to Ozempic)  - Encouraged continuing healthy weight loss via diet and exercise   - continue to f/u with PCP      RTC in 12 months     Current History    On today's visit, the patient reports that he has been doing well on PAP therapy.  Wife remains very happy with the resolution of snoring.  He however did have one question regarding nasal passage being dry.  We found out that his humidity was set to off.  We were able to change it to level 4 and instructed him to adjust as needed.  He was working on weight loss and making progress but have regressed a bit this year.  He is reminded again to keep working on it.    PAP Info  DURABLE MEDICAL EQUIPMENT COMPANY: [employer]  Machine: THERAPY: RESMED AIRSENSE 10  5 cm H2O - 10 cm H2O  Issues with therapy: ISSUES WITH THERAPY: None  Benefits with PAP: PERCEIVED BENEFITS OF PAP: refreshing sleep    RLS Followup:   none.    Daytime  Symptoms    Patient reports DAYTIME SYMPTOMS: no daytime symptoms  Patient denies daytime symptoms including: Denies: excessive daytime sleepiness    Naps: No  Fatigue: denies feeling fatigue    ESS: 8  LETY: 6  FOSQ: 39    REVIEW OF SYSTEMS     REVIEW OF SYSTEMS  Review of Systems   Constitutional: Negative.    HENT: Negative.     Respiratory: Negative.     Cardiovascular: Negative.    Genitourinary: Negative.    Skin: Negative.    Neurological: Negative.    Psychiatric/Behavioral: Negative.           ALLERGIES AND MEDICATIONS     ALLERGIES  Allergies   Allergen Reactions    Shrimp Angioedema    Oxycodone Dizziness       MEDICATIONS: He has a current medication list which includes the following prescription(s): autolet - Use as instructed, lantus solostar u-100 insulin - Inject 15 Units under the skin once daily at bedtime. Take as directed per insulin instructions, insulin syringe,safetyneedle - Use to inject 1-4 times daily as directed, levothyroxine - Take 1 tablet (150 mcg) by mouth once daily in the morning. Take before meals, lisinopril - Take 1 tablet (2.5 mg) by mouth once daily, metformin - Take 1 tablet (1,000 mg) by mouth 2 times daily (morning and late afternoon), semaglutide - Inject 2 mg under the skin 1 (one) time per week, and sildenafil - Take 1 tablet (50 mg) by mouth once daily as needed for erectile dysfunction. 50 mg once daily as needed 1 hour before sexual activity. May increase to a maximum dose of 100 mg once daily if there is incomplete response.    PAST MEDICAL HISTORY : He  has no past medical history on file.    PAST SURGICAL HISTORY: He  has a past surgical history that includes Other surgical history (08/28/2020) and Other surgical history (07/19/2022).     FAMILY HISTORY: No changes since previous visit. Otherwise non-contributory as charted.     SOCIAL HISTORY  He  reports that he has never smoked. He has never been exposed to tobacco smoke. He has never used smokeless tobacco. He  "reports current alcohol use. He reports that he does not use drugs.       PHYSICAL EXAM     VITAL SIGNS: /77 (BP Location: Right arm, Patient Position: Sitting, BP Cuff Size: Adult)   Pulse 84   Temp 36.4 °C (97.6 °F) (Temporal)   Ht 1.702 m (5' 7\")   Wt 93.3 kg (205 lb 9.6 oz)   BMI 32.20 kg/m²      PREVIOUS WEIGHTS:  Wt Readings from Last 3 Encounters:   08/23/24 93.3 kg (205 lb 9.6 oz)   06/13/24 93 kg (205 lb)   04/17/24 93 kg (205 lb)         RESULTS/DATA     Bicarbonate (mmol/L)   Date Value   01/07/2023 29   05/13/2022 27   01/16/2021 27       PAP Adherence  A PAP adherence download was obtained and data was reviewed personally today in clinic.     ASSESSMENT/PLAN     Mr. Mackey is a 48 y.o. male and he returns in followup to the [facility] for ANAI.    Problem List, Orders, Assessment, Recommendations:  Problem List Items Addressed This Visit             ICD-10-CM    ANAI (obstructive sleep apnea) - Primary G47.33     - doing well with PAP therapy  - patient reports that he has been doing well on PAP therapy.  Wife remains very happy with the resolution of snoring.  He however did have one question regarding nasal passage being dry.  We found out that his humidity was set to off.  We were able to change it to level 4 and instructed him to adjust as needed.   - continue current setting  - renew PAP supply orders           Relevant Orders    Positive Airway Pressure (PAP) Therapy    BMI 32.0-32.9,adult Z68.32     BMI Readings from Last 1 Encounters:   08/23/24 32.20 kg/m²     - encouraged healthy weight loss via diet and exercise  - He was working on weight loss and making progress but have regressed a bit this year.  He is reminded again to keep working on it.              Disposition    Return to clinic in 12 months           "

## 2024-09-20 ENCOUNTER — LAB (OUTPATIENT)
Dept: LAB | Facility: LAB | Age: 49
End: 2024-09-20
Payer: COMMERCIAL

## 2024-09-20 ENCOUNTER — APPOINTMENT (OUTPATIENT)
Dept: PRIMARY CARE | Facility: CLINIC | Age: 49
End: 2024-09-20
Payer: COMMERCIAL

## 2024-09-20 VITALS
SYSTOLIC BLOOD PRESSURE: 122 MMHG | DIASTOLIC BLOOD PRESSURE: 78 MMHG | WEIGHT: 205.9 LBS | HEART RATE: 75 BPM | TEMPERATURE: 97.6 F | BODY MASS INDEX: 32.31 KG/M2 | HEIGHT: 67 IN | OXYGEN SATURATION: 98 %

## 2024-09-20 DIAGNOSIS — E11.9 TYPE 2 DIABETES MELLITUS WITHOUT COMPLICATION, WITHOUT LONG-TERM CURRENT USE OF INSULIN (MULTI): Primary | ICD-10-CM

## 2024-09-20 DIAGNOSIS — E11.9 TYPE 2 DIABETES MELLITUS WITHOUT COMPLICATION, WITHOUT LONG-TERM CURRENT USE OF INSULIN (MULTI): ICD-10-CM

## 2024-09-20 PROBLEM — S42.001A CLOSED NONDISPLACED FRACTURE OF RIGHT CLAVICLE: Status: ACTIVE | Noted: 2017-06-22

## 2024-09-20 PROBLEM — E55.9 VITAMIN D DEFICIENCY: Status: ACTIVE | Noted: 2024-09-20

## 2024-09-20 PROBLEM — E89.0 POSTOPERATIVE PRIMARY HYPOTHYROIDISM: Status: ACTIVE | Noted: 2024-09-20

## 2024-09-20 PROBLEM — T48.5X5A RHINITIS MEDICAMENTOSA: Status: ACTIVE | Noted: 2024-09-20

## 2024-09-20 PROBLEM — S89.92XA INJURY OF LEFT LEG: Status: ACTIVE | Noted: 2024-09-20

## 2024-09-20 PROBLEM — M43.6 NECK STIFFNESS: Status: ACTIVE | Noted: 2024-09-20

## 2024-09-20 PROBLEM — N52.9 ERECTILE DYSFUNCTION: Status: ACTIVE | Noted: 2024-09-20

## 2024-09-20 PROBLEM — H17.9 BILATERAL CORNEAL SCARS: Status: ACTIVE | Noted: 2024-09-20

## 2024-09-20 PROBLEM — T78.3XXA ALLERGIC ANGIOEDEMA: Status: ACTIVE | Noted: 2024-09-20

## 2024-09-20 PROBLEM — F41.9 ANXIETY: Status: ACTIVE | Noted: 2024-09-20

## 2024-09-20 PROBLEM — M79.9 POSTURAL STRAIN: Status: ACTIVE | Noted: 2024-09-20

## 2024-09-20 PROBLEM — S46.919A MUSCLE STRAIN OF SHOULDER REGION: Status: ACTIVE | Noted: 2024-09-20

## 2024-09-20 PROBLEM — S22.43XA FRACTURE OF MULTIPLE RIBS OF BOTH SIDES: Status: ACTIVE | Noted: 2017-06-22

## 2024-09-20 PROBLEM — M25.552 LEFT HIP PAIN: Status: ACTIVE | Noted: 2024-09-20

## 2024-09-20 PROBLEM — J30.9 ALLERGIC RHINITIS: Status: ACTIVE | Noted: 2024-09-20

## 2024-09-20 PROBLEM — T50.Z95D ADVERSE EFFECT OF OTHER VACCINES AND BIOLOGICAL SUBSTANCES, SUBSEQUENT ENCOUNTER: Status: ACTIVE | Noted: 2024-09-20

## 2024-09-20 PROBLEM — R06.83 SNORING: Status: ACTIVE | Noted: 2024-09-20

## 2024-09-20 PROBLEM — M99.09 SEGMENTAL AND SOMATIC DYSFUNCTION: Status: ACTIVE | Noted: 2024-09-20

## 2024-09-20 PROBLEM — B35.3 TINEA PEDIS OF BOTH FEET: Status: ACTIVE | Noted: 2024-09-20

## 2024-09-20 PROBLEM — E04.1 THYROID NODULE: Status: ACTIVE | Noted: 2024-09-20

## 2024-09-20 PROBLEM — S40.029A CONTUSION OF UPPER EXTREMITY: Status: ACTIVE | Noted: 2024-09-20

## 2024-09-20 PROBLEM — H10.45 CHRONIC ALLERGIC CONJUNCTIVITIS: Status: ACTIVE | Noted: 2024-09-20
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
## 2024-09-20 PROBLEM — R09.81 NASAL CONGESTION: Status: ACTIVE | Noted: 2024-09-20

## 2024-09-20 PROBLEM — E04.9 GOITER: Status: ACTIVE | Noted: 2024-09-20

## 2024-09-20 PROBLEM — E04.2 MULTIPLE THYROID NODULES: Status: ACTIVE | Noted: 2024-09-20

## 2024-09-20 PROBLEM — Z91.013 ALLERGY TO SHELLFISH: Status: ACTIVE | Noted: 2024-09-20

## 2024-09-20 PROBLEM — T75.3XXA SEA SICKNESS: Status: ACTIVE | Noted: 2024-09-20

## 2024-09-20 PROBLEM — R68.82 DECREASED SEX DRIVE: Status: ACTIVE | Noted: 2024-09-20

## 2024-09-20 PROBLEM — J31.0 RHINITIS MEDICAMENTOSA: Status: ACTIVE | Noted: 2024-09-20

## 2024-09-20 PROBLEM — Z98.890 S/P LASIK SURGERY OF BOTH EYES: Status: ACTIVE | Noted: 2024-09-20

## 2024-09-20 PROBLEM — H52.10 MYOPIA: Status: ACTIVE | Noted: 2024-09-20

## 2024-09-20 PROBLEM — R68.82 REDUCED LIBIDO: Status: ACTIVE | Noted: 2024-09-20

## 2024-09-20 LAB
ALBUMIN SERPL BCP-MCNC: 4.1 G/DL (ref 3.4–5)
ANION GAP SERPL CALC-SCNC: 16 MMOL/L (ref 10–20)
BUN SERPL-MCNC: 11 MG/DL (ref 6–23)
CALCIUM SERPL-MCNC: 9 MG/DL (ref 8.6–10.6)
CHLORIDE SERPL-SCNC: 102 MMOL/L (ref 98–107)
CHOLEST SERPL-MCNC: 206 MG/DL (ref 0–199)
CHOLESTEROL/HDL RATIO: 5
CO2 SERPL-SCNC: 25 MMOL/L (ref 21–32)
CREAT SERPL-MCNC: 0.98 MG/DL (ref 0.5–1.3)
EGFRCR SERPLBLD CKD-EPI 2021: >90 ML/MIN/1.73M*2
EST. AVERAGE GLUCOSE BLD GHB EST-MCNC: 309 MG/DL
GLUCOSE SERPL-MCNC: 171 MG/DL (ref 74–99)
HBA1C MFR BLD: 12.4 %
HDLC SERPL-MCNC: 41.6 MG/DL
LDLC SERPL CALC-MCNC: 136 MG/DL
NON HDL CHOLESTEROL: 164 MG/DL (ref 0–149)
PHOSPHATE SERPL-MCNC: 3.3 MG/DL (ref 2.5–4.9)
POTASSIUM SERPL-SCNC: 3.9 MMOL/L (ref 3.5–5.3)
SODIUM SERPL-SCNC: 139 MMOL/L (ref 136–145)
TRIGL SERPL-MCNC: 142 MG/DL (ref 0–149)
VLDL: 28 MG/DL (ref 0–40)

## 2024-09-20 PROCEDURE — 36415 COLL VENOUS BLD VENIPUNCTURE: CPT

## 2024-09-20 PROCEDURE — 3008F BODY MASS INDEX DOCD: CPT

## 2024-09-20 PROCEDURE — 4010F ACE/ARB THERAPY RXD/TAKEN: CPT

## 2024-09-20 PROCEDURE — 80069 RENAL FUNCTION PANEL: CPT

## 2024-09-20 PROCEDURE — 3061F NEG MICROALBUMINURIA REV: CPT

## 2024-09-20 PROCEDURE — 99213 OFFICE O/P EST LOW 20 MIN: CPT

## 2024-09-20 PROCEDURE — 83036 HEMOGLOBIN GLYCOSYLATED A1C: CPT

## 2024-09-20 PROCEDURE — 3046F HEMOGLOBIN A1C LEVEL >9.0%: CPT

## 2024-09-20 PROCEDURE — 3050F LDL-C >= 130 MG/DL: CPT

## 2024-09-20 PROCEDURE — 3074F SYST BP LT 130 MM HG: CPT

## 2024-09-20 PROCEDURE — 3078F DIAST BP <80 MM HG: CPT

## 2024-09-20 PROCEDURE — 80061 LIPID PANEL: CPT

## 2024-09-20 RX ORDER — ALBUTEROL SULFATE 90 UG/1
2 INHALANT RESPIRATORY (INHALATION) 4 TIMES DAILY PRN
COMMUNITY
Start: 2024-07-12

## 2024-09-20 ASSESSMENT — ENCOUNTER SYMPTOMS: DEPRESSION: 0

## 2024-09-20 ASSESSMENT — PAIN SCALES - GENERAL: PAINLEVEL: 0-NO PAIN

## 2024-09-20 NOTE — PROGRESS NOTES
"Subjective   Patient ID: Juan Mackey is a 48 y.o. male who presents for Follow-up.    HPI    #DM2  - A1c 12.4%  - Patient believes his diet over the past 3 months has been good for the most part. Reports eating baked foods. Seen previously by dietician  - report aim to exercise (bike) 30 mins every other day. Time limited due to work  -Blood glucose 240s at home. Typically checks in the am. Reports eating late at night  - reports compliance to medication plan: ozempic 2mg/week, metformin 1000mg BID, lantus 15U nightly  -Weight stable ~205 today (  216lbs April 2023)  - reports taking nasir man vitamin from [facility]  PLAN  - monitor blood glucose at home. Goals before Meal  mg/dl. After Meal < 180 mg/dl.  - hold off nasir men multivitamin due to concern for possible interactions  - trial jardiance 10mg every day   - consider increase lantus dose    Review of Systems    Previous history  History reviewed. No pertinent past medical history.  Past Surgical History:   Procedure Laterality Date    OTHER SURGICAL HISTORY  08/28/2020    Appendectomy    OTHER SURGICAL HISTORY  07/19/2022    Corneal lasik     Social History     Tobacco Use    Smoking status: Never     Passive exposure: Never    Smokeless tobacco: Never   Substance Use Topics    Alcohol use: Yes    Drug use: Not Currently     Types: Marijuana     No family history on file.  Allergies   Allergen Reactions    Shrimp Angioedema    Oxycodone Dizziness    Shellfish Containing Products Unknown     Current Outpatient Medications   Medication Instructions    albuterol 90 mcg/actuation inhaler 2 puffs, 4 times daily PRN    Autolet lancing device Use as instructed    empagliflozin (JARDIANCE) 10 mg, oral, Daily    insulin syringe,safetyneedle 29G X 1/2\" 0.5 mL syringe Use to inject 1-4 times daily as directed.    Lantus Solostar U-100 Insulin 15 Units, subcutaneous, Nightly, Take as directed per insulin instructions.    levothyroxine (SYNTHROID, LEVOXYL) 150 mcg, oral, " Daily before breakfast    lisinopril 2.5 mg, oral, Daily    metFORMIN (GLUCOPHAGE) 1,000 mg, oral, 2 times daily (morning and late afternoon)    semaglutide 2 mg, subcutaneous, Once Weekly    sildenafil (VIAGRA) 50 mg, oral, Daily PRN, 50 mg once daily as needed 1 hour before sexual activity. May increase to a maximum dose of 100 mg once daily if there is incomplete response.       Objective       Physical Exam  HENT:      Head: Normocephalic and atraumatic.   Eyes:      General: No scleral icterus.     Conjunctiva/sclera: Conjunctivae normal.   Cardiovascular:      Rate and Rhythm: Normal rate and regular rhythm.      Heart sounds: No murmur heard.     No friction rub. No gallop.   Pulmonary:      Effort: Pulmonary effort is normal. No respiratory distress.      Breath sounds: Normal breath sounds.   Abdominal:      Palpations: Abdomen is soft.   Skin:     General: Skin is warm and dry.   Neurological:      General: No focal deficit present.      Mental Status: He is alert and oriented to person, place, and time.   Psychiatric:         Mood and Affect: Mood normal.       Assessment/Plan   Juan Mackey is a 48 y.o. male who presents for the concerns below:    Problem List Items Addressed This Visit       Diabetes mellitus (Multi) - Primary     - monitor blood glucose at home. Goals before Meal  mg/dl. After Meal < 180 mg/dl.  - hold off nasir men multivitamin due to concern for possible interactions  - trial jardiance 10mg every day   - consider increase lantus dose         Relevant Medications    empagliflozin (Jardiance) 10 mg    Other Relevant Orders    Renal function panel (Completed)            Return in : 3 months    Discussed with co-signer of note Dr. Crawford      Portions of this note were generated using digital voice recognition software, and may contain grammatical errors       Juan Roy, DO  Family Medicine PGY-3

## 2024-09-23 DIAGNOSIS — E11.9 TYPE 2 DIABETES MELLITUS WITHOUT COMPLICATION, WITHOUT LONG-TERM CURRENT USE OF INSULIN (MULTI): Primary | ICD-10-CM

## 2024-09-25 NOTE — ASSESSMENT & PLAN NOTE
- monitor blood glucose at home. Goals before Meal  mg/dl. After Meal < 180 mg/dl.  - hold off nasir men multivitamin due to concern for possible interactions  - trial jardiance 10mg every day   - consider increase lantus dose

## 2024-09-27 NOTE — PROGRESS NOTES
I reviewed the resident/fellow's documentation and discussed the patient with the resident/fellow. I agree with the resident/fellow's medical decision making as documented in the note.    Lorie Crawford MD

## 2024-10-04 ENCOUNTER — APPOINTMENT (OUTPATIENT)
Dept: PHARMACY | Facility: HOSPITAL | Age: 49
End: 2024-10-04
Payer: COMMERCIAL

## 2024-10-07 ENCOUNTER — APPOINTMENT (OUTPATIENT)
Dept: PHARMACY | Facility: HOSPITAL | Age: 49
End: 2024-10-07
Payer: COMMERCIAL

## 2024-10-07 DIAGNOSIS — E11.9 TYPE 2 DIABETES MELLITUS WITHOUT COMPLICATION, WITHOUT LONG-TERM CURRENT USE OF INSULIN (MULTI): ICD-10-CM

## 2024-10-07 NOTE — Clinical Note
Patient doing well since starting Jardiance. Will follow up in 1 month to see if need to titrate to 25 mg (most likely) as well see about switching his Ozempic to Mounjaro, and potential dexamethasone testing due to hard to treat diabetes.

## 2024-10-07 NOTE — PROGRESS NOTES
Clinical Pharmacy Appointment    Patient ID: Juan Mackey is a 48 y.o. male who presents for Diabetes.    Pt is here for First appointment.     Referring Provider/PCP: Lorie Crawford MD and Juan Roy DO   Last visit with PCP: 9/20/24   Next visit with PCP: Not yet scheduled (January per patient)      Subjective     HPI  DIABETES MELLITUS TYPE II:    Diagnosed with diabetes: 2019. Known diabetic complications: None.  Does patient follow with Endocrinology: Not for DM  Last optometry exam: This year  Most recent visit in Podiatry: April 2024     Current diabetic medications include:  Jardiance 10 mg daily  Lantus 15 units nightly  Metformin 1000 mg twice daily  Ozempic 2 mg weekly on Mondays    Clarifications to above regimen: None  Adverse Effects: No    Past diabetic medications include:  None    Glucose Readings:  Glucometer/CGM Type: Glucometer  Patient tests BG 1 times per day most days    Current home BG readings: 185 in the morning   Previous home BG readings: N/A    Any episodes of hypoglycemia? No,   .  Did patient treat episode of hypoglycemia appropriately? N/A  Does the patient have a prescription for ready-to-use Glucagon? No    Does pt have proteinuria? No    Lifestyle:  Diet: not discussed meals/day.  BK: unknown  LN: unknown  DN: unknown  Snacks: unknown  Drinks: unknown  Physical Activity: unknown    Secondary Prevention:  Statin? No  ACE-I/ARB? Yes  Aspirin? No    Pertinent PMH Review:  PMH of Pancreatitis: No  PMH of Retinopathy: No  PMH of Urinary Tract Infections: No  PMH of MTC: No    Medication Reconciliation:  Changed:   Added:   Discontinued:     Drug Interactions  No relevant drug interactions were noted.    Medication System Management  Patients preferred pharmacy: [facility]  Adherence/Organization: No issues reported  Affordability/Accessibility: No issues reported      Objective   Allergies   Allergen Reactions    Shrimp Angioedema    Oxycodone Dizziness    Shellfish Containing  "Products Unknown     Social History     Social History Narrative    Not on file      Medication Review  Current Outpatient Medications   Medication Instructions    albuterol 90 mcg/actuation inhaler 2 puffs, 4 times daily PRN    Autolet lancing device Use as instructed    empagliflozin (JARDIANCE) 10 mg, oral, Daily    insulin syringe,safetyneedle 29G X 1/2\" 0.5 mL syringe Use to inject 1-4 times daily as directed.    Lantus Solostar U-100 Insulin 15 Units, subcutaneous, Nightly, Take as directed per insulin instructions.    levothyroxine (SYNTHROID, LEVOXYL) 150 mcg, oral, Daily before breakfast    lisinopril 2.5 mg, oral, Daily    metFORMIN (GLUCOPHAGE) 1,000 mg, oral, 2 times daily (morning and late afternoon)    semaglutide 2 mg, subcutaneous, Once Weekly    sildenafil (VIAGRA) 50 mg, oral, Daily PRN, 50 mg once daily as needed 1 hour before sexual activity. May increase to a maximum dose of 100 mg once daily if there is incomplete response.      Vitals  BP Readings from Last 2 Encounters:   09/20/24 122/78   08/23/24 118/77     BMI Readings from Last 1 Encounters:   09/20/24 32.25 kg/m²      Labs  A1C  Lab Results   Component Value Date    HGBA1C 12.4 (H) 09/20/2024    HGBA1C 11.4 (H) 05/25/2024    HGBA1C 13.5 (H) 02/17/2024     BMP  Lab Results   Component Value Date    CALCIUM 9.0 09/20/2024     09/20/2024    K 3.9 09/20/2024    CO2 25 09/20/2024     09/20/2024    BUN 11 09/20/2024    CREATININE 0.98 09/20/2024    EGFR >90 09/20/2024     LFTs  Lab Results   Component Value Date    ALT 47 07/20/2020    AST 23 07/20/2020    ALKPHOS 93 07/20/2020    BILITOT 0.4 07/20/2020     FLP  Lab Results   Component Value Date    TRIG 142 09/20/2024    CHOL 206 (H) 09/20/2024    LDLCALC 136 (H) 09/20/2024    HDL 41.6 09/20/2024     Urine Microalbumin  Lab Results   Component Value Date    MICROALBCREA 4.8 05/25/2024     Weight Management  Wt Readings from Last 3 Encounters:   09/20/24 93.4 kg (205 lb 14.4 oz) "   08/23/24 93.3 kg (205 lb 9.6 oz)   07/12/24 92.5 kg (203 lb 14.8 oz)      There is no height or weight on file to calculate BMI.     Assessment/Plan   Problem List Items Addressed This Visit       Diabetes mellitus (Multi)     Patient's goal A1c is < 7%.  Is pt at goal? No, 12.4  Patient's SMBGs are fasting around 180-190. Patient recently started on Jardiance and doing well.      Rationale for plan: Doing well on new medication, follow up in a month to see how his sugars are trending.    Medication Changes:  CONTINUE:  Jardiance 10 mg daily  Lantus 15 units nightly  Metformin 1000 mg twice daily  Ozempic 2 mg weekly on Mondays    Future Considerations:  Dexamethasone testing (sent message to [facility])  Increase dose of Jardiance from 10 mg to 25 mg  Switch Ozempic to Mounjaro           Relevant Orders    Follow Up In Clinical Pharmacy       Clinical Pharmacist follow-up: 11/4, Telehealth visit    Continue all meds under the continuation of care with the referring provider and clinical pharmacy team.    Thank you,  Sofia Dorman, PharmD, [facility]  Clinical Pharmacy Specialist  [phone]    Verbal consent to manage patient's drug therapy was obtained from the patient. They were informed they may decline to participate or withdraw from participation in pharmacy services at any time.

## 2024-10-07 NOTE — ASSESSMENT & PLAN NOTE
Patient's goal A1c is < 7%.  Is pt at goal? No, 12.4  Patient's SMBGs are fasting around 180-190. Patient recently started on Jardiance and doing well.      Rationale for plan: Doing well on new medication, follow up in a month to see how his sugars are trending.    Medication Changes:  CONTINUE:  Jardiance 10 mg daily  Lantus 15 units nightly  Metformin 1000 mg twice daily  Ozempic 2 mg weekly on Mondays    Future Considerations:  Dexamethasone testing (sent message to [facility])  Increase dose of Jardiance from 10 mg to 25 mg  Switch Ozempic to Mounjaro

## 2024-10-07 NOTE — Clinical Note
Hello, I am a pharmacist working with this patient through their PCP for their diabetes. I know you are not seeing them for their diabetes, but we were recently given a presentation on dexamethasone testing for our hard to treat diabetics. This patient is now on ozempic, metformin, jardiance, and lantus with an A1C above 10, do you think this patient would be a good fit for that testing? Thank you!

## 2024-10-20 ASSESSMENT — CUP TO DISC RATIO
OS_RATIO: .25
OD_RATIO: .25

## 2024-10-20 ASSESSMENT — EXTERNAL EXAM - RIGHT EYE: OD_EXAM: NORMAL

## 2024-10-20 ASSESSMENT — SLIT LAMP EXAM - LIDS
COMMENTS: GOOD POSITION
COMMENTS: GOOD POSITION

## 2024-10-20 ASSESSMENT — EXTERNAL EXAM - LEFT EYE: OS_EXAM: NORMAL

## 2024-10-20 NOTE — PROGRESS NOTES
Diabetes [id number]  -HbA1c= 12.4 (9/20/24). No diabetic retinopathy seen on exam. Continue close monitoring of blood glucose, blood pressure, and cholesterol. Plan for annual dilated eye exam.  -Endocrinologist: Dr. Sana Arreguin    Bilateral corneal uvrrxD95.9  -Patient does not recall history of direct trauma to the eye, however does have history of foreign bodies in the eyes.  Monitor.  Not visually significant at this time.    S/P LASIK surgery of both [id number]  -s/p LASIK OU in 2018 at [facility] ([place], [place])  -Patient states he did not wear glasses until he was in his late 30s.  He stated he had trouble reading unless he had glasses on, and then he also needed glasses for distance.  Possibly latent hyperopia?    PgzinuV84.10  IzbigryzagcC80.209  NfvtcxurvlI32.4  -Patient denies difficulty with uncorrected distance or uncorrected near vision in most situations except for driving at night in bad weather.  New Rx given - consider driving glasses (DVO) for driving at night/bad weather.   -New Rx for glasses given per patient request. Patient's signature obtained to acknowledge and confirm that a paper copy of glasses Rx was given to patient in compliance with [facility] Eyeglass Rule. Electronic copy of Rx will also be available via [employer].         No history of intraocular surgery   No FH of AMD/glaucoma

## 2024-10-22 ENCOUNTER — APPOINTMENT (OUTPATIENT)
Dept: OPHTHALMOLOGY | Facility: CLINIC | Age: 49
End: 2024-10-22
Payer: COMMERCIAL

## 2024-10-22 DIAGNOSIS — H17.9 BILATERAL CORNEAL SCARS: ICD-10-CM

## 2024-10-22 DIAGNOSIS — Z98.890 S/P LASIK SURGERY OF BOTH EYES: ICD-10-CM

## 2024-10-22 DIAGNOSIS — H52.4 PRESBYOPIA: ICD-10-CM

## 2024-10-22 DIAGNOSIS — H52.13 MYOPIA OF BOTH EYES: ICD-10-CM

## 2024-10-22 DIAGNOSIS — E11.9 DIABETES MELLITUS WITHOUT COMPLICATION (MULTI): Primary | ICD-10-CM

## 2024-10-22 DIAGNOSIS — H52.203 ASTIGMATISM OF BOTH EYES, UNSPECIFIED TYPE: ICD-10-CM

## 2024-10-22 PROCEDURE — 92014 COMPRE OPH EXAM EST PT 1/>: CPT | Performed by: OPHTHALMOLOGY

## 2024-10-22 PROCEDURE — 92015 DETERMINE REFRACTIVE STATE: CPT | Performed by: OPHTHALMOLOGY

## 2024-10-22 ASSESSMENT — REFRACTION_MANIFEST
OS_ADD: +1.75
OD_ADD: +1.75
OD_SPHERE: -1.00
OD_AXIS: 095
OS_SPHERE: -0.75
OD_CYLINDER: -0.75
OS_AXIS: 055
OS_CYLINDER: -0.50

## 2024-10-22 ASSESSMENT — TONOMETRY
OS_IOP_MMHG: 16
IOP_METHOD: GOLDMANN APPLANATION
OD_IOP_MMHG: 15

## 2024-10-22 ASSESSMENT — CONF VISUAL FIELD
OS_NORMAL: 1
OD_INFERIOR_NASAL_RESTRICTION: 0
OS_SUPERIOR_TEMPORAL_RESTRICTION: 0
OS_INFERIOR_NASAL_RESTRICTION: 0
OD_SUPERIOR_NASAL_RESTRICTION: 0
OS_SUPERIOR_NASAL_RESTRICTION: 0
OS_INFERIOR_TEMPORAL_RESTRICTION: 0
OD_INFERIOR_TEMPORAL_RESTRICTION: 0
OD_SUPERIOR_TEMPORAL_RESTRICTION: 0
OD_NORMAL: 1

## 2024-10-22 ASSESSMENT — ENCOUNTER SYMPTOMS: EYES NEGATIVE: 1

## 2024-10-22 ASSESSMENT — VISUAL ACUITY
METHOD: SNELLEN - LINEAR
OD_PH_SC: 20/20
OS_SC: 20/20
OD_SC+: -2
OD_SC: 20/50
OD_PH_SC+: -1

## 2024-11-04 ENCOUNTER — APPOINTMENT (OUTPATIENT)
Dept: PHARMACY | Facility: HOSPITAL | Age: 49
End: 2024-11-04
Payer: COMMERCIAL

## 2024-11-04 DIAGNOSIS — E11.9 TYPE 2 DIABETES MELLITUS WITHOUT COMPLICATION, WITHOUT LONG-TERM CURRENT USE OF INSULIN (MULTI): ICD-10-CM

## 2024-11-04 DIAGNOSIS — E11.9 DIABETES MELLITUS WITHOUT COMPLICATION (MULTI): Primary | ICD-10-CM

## 2024-11-04 RX ORDER — TIRZEPATIDE 7.5 MG/.5ML
7.5 INJECTION, SOLUTION SUBCUTANEOUS
Qty: 2 ML | Refills: 3 | Status: SHIPPED | OUTPATIENT
Start: 2024-11-04

## 2024-11-04 RX ORDER — METFORMIN HYDROCHLORIDE 500 MG/1
1000 TABLET, EXTENDED RELEASE ORAL
Qty: 120 TABLET | Refills: 11 | Status: SHIPPED | OUTPATIENT
Start: 2024-11-04 | End: 2025-11-04

## 2024-11-04 NOTE — PROGRESS NOTES
Clinical Pharmacy Appointment    Patient ID: Juan Mackey is a 48 y.o. male who presents for Diabetes.    Pt is here for Follow Up appointment.     Referring Provider/PCP: Lorie Crawford MD and Juan Roy DO   Last visit with PCP: 9/20/24   Next visit with PCP: Not yet scheduled (January per patient)      Subjective     HPI  DIABETES MELLITUS TYPE II:    Diagnosed with diabetes: 2019. Known diabetic complications: None.  Does patient follow with Endocrinology: Not for DM  Last optometry exam: This year  Most recent visit in Podiatry: April 2024     Current diabetic medications include:  Jardiance 10 mg daily  Lantus 15 units nightly  Metformin 1000 mg twice daily  Ozempic 2 mg weekly on Mondays    Clarifications to above regimen: None  Adverse Effects: Jardiane- A little jittery at the start, urinate, metformin- GI tract/Diarrhea    Past diabetic medications include:  None    Glucose Readings:  Glucometer/CGM Type: Glucometer  Patient tests BG 1 times per day most days    Current home BG readings: 119-126  Previous home BG readings: 185 in the morning     Any episodes of hypoglycemia? No,   .  Did patient treat episode of hypoglycemia appropriately? N/A  Does the patient have a prescription for ready-to-use Glucagon? No    Does pt have proteinuria? No    Lifestyle:  Diet: not discussed meals/day.  BK: unknown  LN: unknown  DN: unknown  Snacks: unknown  Drinks: unknown  Physical Activity: unknown    Secondary Prevention:  Statin? No  ACE-I/ARB? Yes  Aspirin? No    Pertinent PMH Review:  PMH of Pancreatitis: No  PMH of Retinopathy: No  PMH of Urinary Tract Infections: No  PMH of MTC: No    Medication Reconciliation:  Changed:   Added:   Discontinued:     Drug Interactions  No relevant drug interactions were noted.    Medication System Management  [facility]  Adherence/Organization: No issues reported  Affordability/Accessibility: No issues reported      Objective   Allergies   Allergen  "CC  f/u    MOHAN Pinzon is an 16-year-old established male here for follow-up.  He was seen on September 10 with complaint of abdominal pain for the past year, associated with periodic appetite loss, nausea and frequent bowel movements.  He was started on Carafate and omeprazole.  Pain has improved if not completely resolved.  Stools are formed and nonpainful.  Denies any further acid reflux or abdominal pain.  Took an entire month of Carafate and is on omeprazole once in the morning.    Also continues with mild depression.  Feels sad easily.  Went through a rough break-up a few days ago.  Grades are suffering as he has 1C but mostly A's and B's.  Trouble motivating and concentrating.  Denies suicidal or homicidal ideations.  Would like to try mild antidepressant.  Sleeps a lot.    Past medical, surgical, family, and social history is reviewed and updated in Epic chart by me today.   Medications and allergies reviewed and updated in Epic chart by me today.     ROS:   As documented in history of present illness above    Exam:  /66   Pulse 100   Temp 36.3 °C (97.4 °F)   Resp 12   Ht 1.82 m (5' 11.65\")   Wt 95.3 kg (210 lb)   SpO2 96%   Gen. appears healthy in no distress   Skin appropriate for sex and age   Neck trachea is midline  Lungs unlabored breathing  Heart regular rate  Neuro gait and station normal   Psych appropriate, calm, interactive, well-groomed    Assessment / Plan / Medical Decision makin. Mild depression (HCC)  -Trial of Wellbutrin 150 mg XL which we discussed would help with motivation, attention struggles, and mild depression.  Discussed length of onset as well as potential side effects of Wellbutrin.  He will email me in 1 month and follow-up here in person in 3 months.  Encouraged him to exercise every day, cut back on caffeine, eat healthy, avoid alcohol and drugs.  Denies SI or HI.  - buPROPion (WELLBUTRIN XL) 150 MG XL tablet; Take 1 Tab by mouth every morning.  Dispense: 30 Tab; " "Reactions    Shrimp Angioedema    Oxycodone Dizziness    Shellfish Containing Products Unknown     Social History     Social History Narrative    Not on file      Medication Review  Current Outpatient Medications   Medication Instructions    albuterol 90 mcg/actuation inhaler 2 puffs, 4 times daily PRN    Autolet lancing device Use as instructed    empagliflozin (JARDIANCE) 10 mg, oral, Daily    insulin syringe,safetyneedle 29G X 1/2\" 0.5 mL syringe Use to inject 1-4 times daily as directed.    Lantus Solostar U-100 Insulin 15 Units, subcutaneous, Nightly, Take as directed per insulin instructions.    levothyroxine (SYNTHROID, LEVOXYL) 150 mcg, oral, Daily before breakfast    lisinopril 2.5 mg, oral, Daily    metFORMIN XR (GLUCOPHAGE-XR) 1,000 mg, oral, 2 times daily (morning and late afternoon), Do not crush, chew, or split.    Mounjaro 7.5 mg, subcutaneous, Every 7 days    sildenafil (VIAGRA) 50 mg, oral, Daily PRN, 50 mg once daily as needed 1 hour before sexual activity. May increase to a maximum dose of 100 mg once daily if there is incomplete response.      Vitals  BP Readings from Last 2 Encounters:   09/20/24 122/78   08/23/24 118/77     BMI Readings from Last 1 Encounters:   09/20/24 32.25 kg/m²      Labs  A1C  Lab Results   Component Value Date    HGBA1C 12.4 (H) 09/20/2024    HGBA1C 11.4 (H) 05/25/2024    HGBA1C 13.5 (H) 02/17/2024     BMP  Lab Results   Component Value Date    CALCIUM 9.0 09/20/2024     09/20/2024    K 3.9 09/20/2024    CO2 25 09/20/2024     09/20/2024    BUN 11 09/20/2024    CREATININE 0.98 09/20/2024    EGFR >90 09/20/2024     LFTs  Lab Results   Component Value Date    ALT 47 07/20/2020    AST 23 07/20/2020    ALKPHOS 93 07/20/2020    BILITOT 0.4 07/20/2020     FLP  Lab Results   Component Value Date    TRIG 142 09/20/2024    CHOL 206 (H) 09/20/2024    LDLCALC 136 (H) 09/20/2024    HDL 41.6 09/20/2024     Urine Microalbumin  Lab Results   Component Value Date    " Refill: 5    2. Generalized abdominal pain  -Resolved.  Discontinue Carafate.  Continue omeprazole until our follow-up in 3 months.    3. Need for influenza vaccination  - Influenza Vaccine Quad Injection (PF)     MICROALBCREA 4.8 05/25/2024     Weight Management  Wt Readings from Last 3 Encounters:   09/20/24 93.4 kg (205 lb 14.4 oz)   08/23/24 93.3 kg (205 lb 9.6 oz)   07/12/24 92.5 kg (203 lb 14.8 oz)      There is no height or weight on file to calculate BMI.     Assessment/Plan   Problem List Items Addressed This Visit       Diabetes mellitus without complication (Multi) - Primary     Patient's goal A1c is < 7%.  Is pt at goal? No, 12.4  Patient's SMBGs are fasting have improved to 119-125. Patient reports diarrhea from metformin and frequent urination from Jardiance. Switch metformin to extended release for GI tolerability. Switch ozempic to Mounjaro to help further control sugar levels.      Rationale for plan: See above.    Medication Changes:  CONTINUE:  Jardiance 10 mg daily  Lantus 15 units nightly  STOP  Metformin 1000 mg twice daily  Ozempic 2 mg weekly on Mondays  START  Metformin  mg two tablets twice daily  Mounjaro 7.5 mg once weekly    Future Considerations:  Dexamethasone testing (sent message to [employer])  Increase dose of Jardiance from 10 mg to 25 mg         Relevant Medications    tirzepatide (Mounjaro) 7.5 mg/0.5 mL pen injector    metFORMIN XR (Glucophage-XR) 500 mg 24 hr tablet     Other Visit Diagnoses       Type 2 diabetes mellitus without complication, without long-term current use of insulin (Multi)        Relevant Medications    tirzepatide (Mounjaro) 7.5 mg/0.5 mL pen injector    metFORMIN XR (Glucophage-XR) 500 mg 24 hr tablet    Other Relevant Orders    Referral to Clinical Pharmacy              Clinical Pharmacist follow-up: 11/25 at 1140, Telehealth visit    Continue all meds under the continuation of care with the referring provider and clinical pharmacy team.    Thank you,  Sofia Dorman, PharmD, [facility]  Clinical Pharmacy Specialist  [phone]    Verbal consent to manage patient's drug therapy was obtained from the patient. They were informed they may decline to participate or  withdraw from participation in pharmacy services at any time.

## 2024-11-04 NOTE — Clinical Note
Patients fasting sugars have improved to 120s with start of Jardiance. Patient reports diarrhea from the metformin, so switching him to ER. Switching Ozempic to Mounjaro for more potent GLP1.

## 2024-11-04 NOTE — ASSESSMENT & PLAN NOTE
Patient's goal A1c is < 7%.  Is pt at goal? No, 12.4  Patient's SMBGs are fasting have improved to 119-125. Patient reports diarrhea from metformin and frequent urination from Jardiance. Switch metformin to extended release for GI tolerability. Switch ozempic to Mounjaro to help further control sugar levels.      Rationale for plan: See above.    Medication Changes:  CONTINUE:  Jardiance 10 mg daily  Lantus 15 units nightly  STOP  Metformin 1000 mg twice daily  Ozempic 2 mg weekly on Mondays  START  Metformin  mg two tablets twice daily  Mounjaro 7.5 mg once weekly    Future Considerations:  Dexamethasone testing (sent message to [employer])  Increase dose of Jardiance from 10 mg to 25 mg

## 2024-11-18 ENCOUNTER — OFFICE VISIT (OUTPATIENT)
Dept: URGENT CARE | Age: 49
End: 2024-11-18
Payer: COMMERCIAL

## 2024-11-18 VITALS
BODY MASS INDEX: 32.49 KG/M2 | WEIGHT: 207 LBS | DIASTOLIC BLOOD PRESSURE: 73 MMHG | RESPIRATION RATE: 18 BRPM | HEART RATE: 82 BPM | HEIGHT: 67 IN | SYSTOLIC BLOOD PRESSURE: 125 MMHG | OXYGEN SATURATION: 98 % | TEMPERATURE: 98.5 F

## 2024-11-18 DIAGNOSIS — R68.89 FLU-LIKE SYMPTOMS: ICD-10-CM

## 2024-11-18 DIAGNOSIS — J06.9 VIRAL URI: Primary | ICD-10-CM

## 2024-11-18 LAB
POC RAPID INFLUENZA A: NEGATIVE
POC RAPID INFLUENZA B: NEGATIVE

## 2024-11-18 PROCEDURE — 87804 INFLUENZA ASSAY W/OPTIC: CPT | Performed by: NURSE PRACTITIONER

## 2024-11-18 PROCEDURE — 3061F NEG MICROALBUMINURIA REV: CPT | Performed by: NURSE PRACTITIONER

## 2024-11-18 PROCEDURE — 4010F ACE/ARB THERAPY RXD/TAKEN: CPT | Performed by: NURSE PRACTITIONER

## 2024-11-18 PROCEDURE — 3074F SYST BP LT 130 MM HG: CPT | Performed by: NURSE PRACTITIONER

## 2024-11-18 PROCEDURE — 3046F HEMOGLOBIN A1C LEVEL >9.0%: CPT | Performed by: NURSE PRACTITIONER

## 2024-11-18 PROCEDURE — 3050F LDL-C >= 130 MG/DL: CPT | Performed by: NURSE PRACTITIONER

## 2024-11-18 PROCEDURE — 99213 OFFICE O/P EST LOW 20 MIN: CPT | Performed by: NURSE PRACTITIONER

## 2024-11-18 PROCEDURE — 3008F BODY MASS INDEX DOCD: CPT | Performed by: NURSE PRACTITIONER

## 2024-11-18 PROCEDURE — 3078F DIAST BP <80 MM HG: CPT | Performed by: NURSE PRACTITIONER

## 2024-11-18 ASSESSMENT — ENCOUNTER SYMPTOMS
FATIGUE: 1
CHEST TIGHTNESS: 1
SINUS PRESSURE: 1
FEVER: 1
CHILLS: 1
COUGH: 1
RHINORRHEA: 1

## 2024-11-18 NOTE — LETTER
November 18, 2024     Patient: Juan Mackey   YOB: 1975   Date of Visit: 11/18/2024       To Whom It May Concern:    Juan Mackey was seen in my clinic on 11/18/2024 at 5:45 pm. Please excuse Juan for his absence from work on this day to make the appointment. Please excuse Mr. Mackey from work till 11-.    If you have any questions or concerns, please don't hesitate to call.         Sincerely,         Ishan Estrada, APRN-CNP        CC: No Recipients

## 2024-11-18 NOTE — PATIENT INSTRUCTIONS
Ibuprofen/tylenol for pain  Mucinex  Work note  If no improvement in the next 3-5 days, please come back to  for further eval    Please follow up with your primary provider within one week if symptoms do not improve.  You may schedule an appointment online at [email or web address] or call [phone]. Go to the Emergency Department if symptoms significantly worsen or if you develop chest pain or shortness of breath.

## 2024-11-18 NOTE — PROGRESS NOTES
"Subjective   Patient ID: Juan Mackey is a 48 y.o. male. They present today with a chief complaint of Sinus Problem (1 day ) and Generalized Body Aches.    History of Present Illness  Juan Mackey is a 48 y.o. male who presents to the clinic for sinus pressure, chest congestion, cough for the past day.. pt took at home covid test (negative) Pt denies any chest pain, sob, N/V at this time in clinic.             Past Medical History  Allergies as of 11/18/2024 - Reviewed 10/22/2024   Allergen Reaction Noted    Shrimp Angioedema 05/10/2023    Oxycodone Dizziness 05/10/2023    Shellfish containing products Unknown 09/20/2024       (Not in a hospital admission)       No past medical history on file.    Past Surgical History:   Procedure Laterality Date    OTHER SURGICAL HISTORY  08/28/2020    Appendectomy    OTHER SURGICAL HISTORY  07/19/2022    Corneal lasik        reports that he has never smoked. He has never been exposed to tobacco smoke. He has never used smokeless tobacco. He reports current alcohol use. He reports that he does not currently use drugs after having used the following drugs: Marijuana.    Review of Systems  Review of Systems   Constitutional:  Positive for chills, fatigue and fever.   HENT:  Positive for congestion, rhinorrhea and sinus pressure.    Respiratory:  Positive for cough and chest tightness.    All other systems reviewed and are negative.                                 Objective    Vitals:    11/18/24 1726   BP: 125/73   Pulse: 82   Resp: 18   Temp: 36.9 °C (98.5 °F)   TempSrc: Oral   SpO2: 98%   Weight: 93.9 kg (207 lb)   Height: 1.702 m (5' 7\")     No LMP for male patient.    Physical Exam  Constitutional:       Appearance: Normal appearance.   HENT:      Right Ear: Tympanic membrane normal.      Left Ear: Tympanic membrane normal.      Mouth/Throat:      Pharynx: Posterior oropharyngeal erythema present.   Eyes:      Extraocular Movements: Extraocular movements intact.      " Conjunctiva/sclera: Conjunctivae normal.      Pupils: Pupils are equal, round, and reactive to light.   Cardiovascular:      Rate and Rhythm: Normal rate and regular rhythm.   Pulmonary:      Effort: Pulmonary effort is normal.      Breath sounds: Normal breath sounds.   Neurological:      General: No focal deficit present.      Mental Status: He is alert and oriented to person, place, and time. Mental status is at baseline.   Psychiatric:         Mood and Affect: Mood normal.         Behavior: Behavior normal.         Procedures    Point of Care Test & Imaging Results from this visit  Results for orders placed or performed in visit on 11/18/24   POCT Influenza A/B manually resulted   Result Value Ref Range    POC Rapid Influenza A Negative Negative    POC Rapid Influenza B Negative Negative      No results found.    Diagnostic study results (if any) were reviewed by ANGEL Leal.    Assessment/Plan   Allergies, medications, history, and pertinent labs/EKGs/Imaging reviewed by ANGEL Leal.     Medical Decision Making  History and exam consistent with viral URI - At this time there is no indication for further labs, imaging, or antibiotic therapy. No evidence of sepsis, strep, pneumonia, otitis, bacterial sinusitis or other bacterial etiology. Counseled patient/family on supportive measures at home. Encouraged to return to clinic or present to ED if symptoms change or worsen. Otherwise advised to follow-up with PCP. Patient verbalized understanding and agrees with plan. No evidence to suggest COVID-19.    Orders and Diagnoses  Diagnoses and all orders for this visit:  Viral URI  Flu-like symptoms  -     POCT Influenza A/B manually resulted      Medical Admin Record      Patient disposition: Home    Electronically signed by ANGEL Leal  6:49 PM

## 2024-11-25 ENCOUNTER — APPOINTMENT (OUTPATIENT)
Dept: PHARMACY | Facility: HOSPITAL | Age: 49
End: 2024-11-25
Payer: COMMERCIAL

## 2024-11-25 DIAGNOSIS — E11.9 TYPE 2 DIABETES MELLITUS WITHOUT COMPLICATION, WITHOUT LONG-TERM CURRENT USE OF INSULIN (MULTI): ICD-10-CM

## 2024-11-25 DIAGNOSIS — E11.9 DIABETES MELLITUS WITHOUT COMPLICATION (MULTI): Primary | ICD-10-CM

## 2024-11-25 NOTE — PROGRESS NOTES
Clinical Pharmacy Appointment    Patient ID: Juan Mackey is a 48 y.o. male who presents for Diabetes.    Pt is here for Follow Up appointment.     Referring Provider/PCP: Lorie Crawford MD and Juan Roy DO   Last visit with PCP: 9/20/24   Next visit with PCP: 2/4/2025    Subjective     HPI  DIABETES MELLITUS TYPE II:    Diagnosed with diabetes: 2019. Known diabetic complications: None.  Does patient follow with Endocrinology: Not for DM  Last optometry exam: This year  Most recent visit in Podiatry: April 2024     Current diabetic medications include:  Jardiance 10 mg daily- Patient stopped  Lantus 15 units nightly  Metformin 1000 mg twice daily  Ozempic 2 mg on Mondays    Clarifications to above regimen: None  Adverse Effects: Jardiance- A little jittery at the start, urinate, metformin- GI tract/Diarrhea    Past diabetic medications include:  None    Glucose Readings:  Glucometer/CGM Type: Glucometer  Patient tests BG 1 times per day most days    Current home BG readings: 121-130  Previous home BG readings:   119-126  185 in the morning     Any episodes of hypoglycemia? No,   .  Did patient treat episode of hypoglycemia appropriately? N/A  Does the patient have a prescription for ready-to-use Glucagon? No    Does pt have proteinuria? No    Lifestyle:  Diet: not discussed meals/day.  BK: unknown  LN: unknown  DN: unknown  Snacks: unknown  Drinks: unknown  Physical Activity: unknown    Secondary Prevention:  Statin? No  ACE-I/ARB? Yes  Aspirin? No    Pertinent PMH Review:  PMH of Pancreatitis: No  PMH of Retinopathy: No  PMH of Urinary Tract Infections: No  PMH of MTC: No    Medication Reconciliation:  Changed:   Added:   Discontinued:     Drug Interactions  No relevant drug interactions were noted.    Medication System Management  [facility]  Adherence/Organization: No issues reported  Affordability/Accessibility: No issues reported      Objective   Allergies   Allergen Reactions  "   Shrimp Angioedema    Oxycodone Dizziness    Shellfish Containing Products Unknown     Social History     Social History Narrative    Not on file      Medication Review  Current Outpatient Medications   Medication Instructions    albuterol 90 mcg/actuation inhaler 2 puffs, 4 times daily PRN    Autolet lancing device Use as instructed    empagliflozin (JARDIANCE) 10 mg, oral, Daily    insulin syringe,safetyneedle 29G X 1/2\" 0.5 mL syringe Use to inject 1-4 times daily as directed.    Lantus Solostar U-100 Insulin 15 Units, subcutaneous, Nightly, Take as directed per insulin instructions.    levothyroxine (SYNTHROID, LEVOXYL) 150 mcg, oral, Daily before breakfast    lisinopril 2.5 mg, oral, Daily    metFORMIN XR (GLUCOPHAGE-XR) 1,000 mg, oral, 2 times daily (morning and late afternoon), Do not crush, chew, or split.    Mounjaro 7.5 mg, subcutaneous, Every 7 days    sildenafil (VIAGRA) 50 mg, oral, Daily PRN, 50 mg once daily as needed 1 hour before sexual activity. May increase to a maximum dose of 100 mg once daily if there is incomplete response.      Vitals  BP Readings from Last 2 Encounters:   11/18/24 125/73   09/20/24 122/78     BMI Readings from Last 1 Encounters:   11/18/24 32.42 kg/m²      Labs  A1C  Lab Results   Component Value Date    HGBA1C 12.4 (H) 09/20/2024    HGBA1C 11.4 (H) 05/25/2024    HGBA1C 13.5 (H) 02/17/2024     BMP  Lab Results   Component Value Date    CALCIUM 9.0 09/20/2024     09/20/2024    K 3.9 09/20/2024    CO2 25 09/20/2024     09/20/2024    BUN 11 09/20/2024    CREATININE 0.98 09/20/2024    EGFR >90 09/20/2024     LFTs  Lab Results   Component Value Date    ALT 47 07/20/2020    AST 23 07/20/2020    ALKPHOS 93 07/20/2020    BILITOT 0.4 07/20/2020     FLP  Lab Results   Component Value Date    TRIG 142 09/20/2024    CHOL 206 (H) 09/20/2024    LDLCALC 136 (H) 09/20/2024    HDL 41.6 09/20/2024     Urine Microalbumin  Lab Results   Component Value Date    MICROALBCREA 4.8 " 05/25/2024     Weight Management  Wt Readings from Last 3 Encounters:   11/18/24 93.9 kg (207 lb)   09/20/24 93.4 kg (205 lb 14.4 oz)   08/23/24 93.3 kg (205 lb 9.6 oz)      There is no height or weight on file to calculate BMI.     Assessment/Plan   Problem List Items Addressed This Visit       Diabetes mellitus without complication (Multi) - Primary     Patient's goal A1c is < 7%.  Is pt at goal? No, 12.4  Patient's SMBGs are fasting stable at 121-130. Patient was unable to get mounjaro or metformin filled. [facility], [facility]. Patient reports stopping Jardiance as well. Can continue off as we titrate Mounjaro.  Rationale for plan: See above.    Medication Changes:  CONTINUE:  Lantus 15 units nightly  STOP  Metformin 1000 mg twice daily  Ozempic 2 mg weekly on Mondays  Jardiance 10 mg daily  START  Metformin  mg two tablets twice daily  Mounjaro 7.5 mg once weekly    Future Considerations:  Dexamethasone testing (sent message to [employer])  Increase dose of Jardiance from 10 mg to 25 mg         Relevant Orders    Referral to Clinical Pharmacy     Other Visit Diagnoses       Type 2 diabetes mellitus without complication, without long-term current use of insulin (Multi)        Relevant Orders    Referral to Clinical Pharmacy          Clinical Pharmacist follow-up: 12/23 at 1120, Telehealth visit    Continue all meds under the continuation of care with the referring provider and clinical pharmacy team.    Thank you,  Sofia Dorman, PharmD, [facility]  Clinical Pharmacy Specialist  [phone]    Verbal consent to manage patient's drug therapy was obtained from the patient. They were informed they may decline to participate or withdraw from participation in pharmacy services at any time.

## 2024-11-25 NOTE — ASSESSMENT & PLAN NOTE
Patient's goal A1c is < 7%.  Is pt at goal? No, 12.4  Patient's SMBGs are fasting stable at 121-130. Patient was unable to get mounjaro or metformin filled. [facility], [facility] are [facility]. Patient reports stopping Jardiance as well. Can continue off as we titrate Mounjaro.  Rationale for plan: See above.    Medication Changes:  CONTINUE:  Lantus 15 units nightly  STOP  Metformin 1000 mg twice daily  Ozempic 2 mg weekly on Mondays  Jardiance 10 mg daily  START  Metformin  mg two tablets twice daily  Mounjaro 7.5 mg once weekly    Future Considerations:  Dexamethasone testing (sent message to [employer])  Increase dose of Jardiance from 10 mg to 25 mg

## 2024-11-27 ENCOUNTER — APPOINTMENT (OUTPATIENT)
Dept: OTOLARYNGOLOGY | Facility: CLINIC | Age: 49
End: 2024-11-27
Payer: COMMERCIAL

## 2024-11-27 DIAGNOSIS — R68.89 ENT COMPLAINT: ICD-10-CM

## 2024-11-27 DIAGNOSIS — J34.2 DEVIATED NASAL SEPTUM: ICD-10-CM

## 2024-11-27 DIAGNOSIS — J34.89 NASAL CRUSTING: Primary | ICD-10-CM

## 2024-11-27 PROCEDURE — 99203 OFFICE O/P NEW LOW 30 MIN: CPT | Performed by: OTOLARYNGOLOGY

## 2024-11-27 PROCEDURE — 3061F NEG MICROALBUMINURIA REV: CPT | Performed by: OTOLARYNGOLOGY

## 2024-11-27 PROCEDURE — 3050F LDL-C >= 130 MG/DL: CPT | Performed by: OTOLARYNGOLOGY

## 2024-11-27 PROCEDURE — 1036F TOBACCO NON-USER: CPT | Performed by: OTOLARYNGOLOGY

## 2024-11-27 PROCEDURE — 3046F HEMOGLOBIN A1C LEVEL >9.0%: CPT | Performed by: OTOLARYNGOLOGY

## 2024-11-27 PROCEDURE — 4010F ACE/ARB THERAPY RXD/TAKEN: CPT | Performed by: OTOLARYNGOLOGY

## 2024-11-27 RX ORDER — BACITRACIN 500 [USP'U]/G
OINTMENT TOPICAL
Qty: 28 G | Refills: 1 | Status: SHIPPED | OUTPATIENT
Start: 2024-11-27

## 2024-11-27 NOTE — LETTER
"November 27, 2024     Boom Xiong PA-C  [address] [facility] [address]    Patient: Juan Mackey   YOB: 1975   Date of Visit: 11/27/2024       Dear Dr. Boom Xiong PA-C:    Thank you for referring Juan Mackey to me for evaluation. Below are my notes for this consultation.  If you have questions, please do not hesitate to call me. I look forward to following your patient along with you.       Sincerely,     Mona Rutledge MD      CC: No Recipients  ______________________________________________________________________________________    History Of Present Illness  Juan Mackey is a 48 y.o. male presenting with: \"Nose irritation, hard to breathe, dryness\".  He is kindly referred by Boom Xiong PA-C.    For more than 6 months, he has dryness in his nose. He uses a CPAP at night.   On examination, nasal septum is deviated to right anteriorly There is scarring of nasal mucosa on both sides of nasal septum anteriorly. Dryness and crusting (+)    Plan:  1- nozaid spray  2-bacitracin with zinc antibiotic ointment 3 times a day for 3 weeks  3-follow-up in 3 weeks  4-consider septoplasty and radiofrequency turbinate reduction     Past Medical History  He has no past medical history on file.    Surgical History  He has a past surgical history that includes Other surgical history (08/28/2020) and Other surgical history (07/19/2022).     Social History  He reports that he has never smoked. He has never been exposed to tobacco smoke. He has never used smokeless tobacco. He reports current alcohol use. He reports that he does not currently use drugs after having used the following drugs: Marijuana.    Family History  No family history on file.     Allergies  Shrimp, Oxycodone, and Shellfish containing products    Review of Systems   Nosebleeds  Nasal discharge  Sinus pressure  Nasal blockage  Snoring  Dry mouth/mouth breathing     Physical Exam    General appearance: Healthy-appearing, " "well-nourished, well groomed, in no acute distress.     Head and Face: Atraumatic with no masses, lesions, or scarring.      Facial strength: Normal strength and symmetry, no synkinesis or facial tic.     Eyes: Conjunctivas look non-hyperemic bilaterally    Ears: not checked today.    Nose: On examination, nasal septum is deviated to right anteriorly There is scarring of nasal mucosa on both sides of nasal septum anteriorly. Dryness and crusting (+)    Throat: No postnasal discharge. No tonsil hypertrophy. No hyperemia.    Neck: Looks symmetrical,     Pulmonary: Normal respiratory effort.     Neurological/Psychiatric Orientation to person, place, and time: Normal.     Mood and affect: Normal.      Extremities: No clubbing.     Skin: No significant skin lesions were noted at face or neck        Procedure         Last Recorded Vitals  There were no vitals taken for this visit.    Relevant Results    Assessment and Plan:  Juan Mackey is a 48 y.o. male presenting with: \"Nose irritation, hard to breathe, dryness\".  He is kindly referred by Boom Xiong PA-C.    For more than 6 months, he has dryness in his nose. He uses a CPAP at night.   On examination, nasal septum is deviated to right anteriorly There is scarring of nasal mucosa on both sides of nasal septum anteriorly. Dryness and crusting (+)    Plan:  1- nozaid spray  2-bacitracin with zinc antibiotic ointment 3 times a day for 3 weeks  3-follow-up in 3 weeks  4-consider septoplasty and radiofrequency turbinate reduction    Mona Rutledge  Otolaryngology - Head & Neck Surgery    "

## 2024-11-27 NOTE — PROGRESS NOTES
"History Of Present Illness  Juan Mackey is a 48 y.o. male presenting with: \"Nose irritation, hard to breathe, dryness\".  He is kindly referred by Boom Xiong PA-C.    For more than 6 months, he has dryness in his nose. He uses a CPAP at night.   On examination, nasal septum is deviated to right anteriorly There is scarring of nasal mucosa on both sides of nasal septum anteriorly. Dryness and crusting (+)    Plan:  1- nozaid spray  2-bacitracin with zinc antibiotic ointment 3 times a day for 3 weeks  3-follow-up in 3 weeks  4-consider septoplasty and radiofrequency turbinate reduction     Past Medical History  He has no past medical history on file.    Surgical History  He has a past surgical history that includes Other surgical history (08/28/2020) and Other surgical history (07/19/2022).     Social History  He reports that he has never smoked. He has never been exposed to tobacco smoke. He has never used smokeless tobacco. He reports current alcohol use. He reports that he does not currently use drugs after having used the following drugs: Marijuana.    Family History  No family history on file.     Allergies  Shrimp, Oxycodone, and Shellfish containing products    Review of Systems   Nosebleeds  Nasal discharge  Sinus pressure  Nasal blockage  Snoring  Dry mouth/mouth breathing     Physical Exam    General appearance: Healthy-appearing, well-nourished, well groomed, in no acute distress.     Head and Face: Atraumatic with no masses, lesions, or scarring.      Facial strength: Normal strength and symmetry, no synkinesis or facial tic.     Eyes: Conjunctivas look non-hyperemic bilaterally    Ears: not checked today.    Nose: On examination, nasal septum is deviated to right anteriorly There is scarring of nasal mucosa on both sides of nasal septum anteriorly. Dryness and crusting (+)    Throat: No postnasal discharge. No tonsil hypertrophy. No hyperemia.    Neck: Looks symmetrical,     Pulmonary: Normal " "respiratory effort.     Neurological/Psychiatric Orientation to person, place, and time: Normal.     Mood and affect: Normal.      Extremities: No clubbing.     Skin: No significant skin lesions were noted at face or neck        Procedure         Last Recorded Vitals  There were no vitals taken for this visit.    Relevant Results    Assessment and Plan:  Juan Mackey is a 48 y.o. male presenting with: \"Nose irritation, hard to breathe, dryness\".  He is kindly referred by Boom Xiong PA-C.    For more than 6 months, he has dryness in his nose. He uses a CPAP at night.   On examination, nasal septum is deviated to right anteriorly There is scarring of nasal mucosa on both sides of nasal septum anteriorly. Dryness and crusting (+)    Plan:  1- nozaid spray  2-bacitracin with zinc antibiotic ointment 3 times a day for 3 weeks  3-follow-up in 3 weeks  4-consider septoplasty and radiofrequency turbinate reduction    Mnoa Rutledge  Otolaryngology - Head & Neck Surgery  "

## 2024-12-18 ENCOUNTER — APPOINTMENT (OUTPATIENT)
Dept: OTOLARYNGOLOGY | Facility: CLINIC | Age: 49
End: 2024-12-18
Payer: COMMERCIAL

## 2024-12-18 DIAGNOSIS — Z86.69 HISTORY OF NOSE DISORDER: Primary | ICD-10-CM

## 2024-12-18 PROCEDURE — 3050F LDL-C >= 130 MG/DL: CPT | Performed by: OTOLARYNGOLOGY

## 2024-12-18 PROCEDURE — 3061F NEG MICROALBUMINURIA REV: CPT | Performed by: OTOLARYNGOLOGY

## 2024-12-18 PROCEDURE — 3046F HEMOGLOBIN A1C LEVEL >9.0%: CPT | Performed by: OTOLARYNGOLOGY

## 2024-12-18 PROCEDURE — 4010F ACE/ARB THERAPY RXD/TAKEN: CPT | Performed by: OTOLARYNGOLOGY

## 2024-12-18 NOTE — PROGRESS NOTES
History Of Present Illness  Juan Mackey is a 49 y.o. male presenting with ***.     Past Medical History  He has no past medical history on file.    Surgical History  He has a past surgical history that includes Other surgical history (08/28/2020) and Other surgical history (07/19/2022).     Social History  He reports that he has never smoked. He has never been exposed to tobacco smoke. He has never used smokeless tobacco. He reports current alcohol use. He reports that he does not currently use drugs after having used the following drugs: Marijuana.    Family History  No family history on file.     Allergies  Shrimp, Oxycodone, and Shellfish containing products    Review of Systems     Physical Exam     Last Recorded Vitals  There were no vitals taken for this visit.    Relevant Results  {If you would like to pull in Lab results for the last 24 hours, type .[id number] :99}  {If you would like to pull in Imaging results, type .imgrslt :99}    Scheduled medications    Continuous medications    PRN medications      ***     Assessment/Plan   {Assess/PlanSmartLinks:82273}    ***       I spent *** minutes in the professional and overall care of this patient.      Mona Rutledge MD

## 2024-12-23 ENCOUNTER — APPOINTMENT (OUTPATIENT)
Dept: PHARMACY | Facility: HOSPITAL | Age: 49
End: 2024-12-23
Payer: COMMERCIAL

## 2024-12-23 DIAGNOSIS — E11.9 DIABETES MELLITUS WITHOUT COMPLICATION (MULTI): ICD-10-CM

## 2024-12-23 DIAGNOSIS — E11.9 TYPE 2 DIABETES MELLITUS WITHOUT COMPLICATION, WITHOUT LONG-TERM CURRENT USE OF INSULIN (MULTI): ICD-10-CM

## 2024-12-23 RX ORDER — TIRZEPATIDE 10 MG/.5ML
10 INJECTION, SOLUTION SUBCUTANEOUS
Qty: 2 ML | Refills: 3 | Status: SHIPPED | OUTPATIENT
Start: 2024-12-23

## 2024-12-23 NOTE — ASSESSMENT & PLAN NOTE
Patient's goal A1c is < 7%.  Is pt at goal? No, 12.4  Patient's SMBGs are fasting stable at 130's. Sugars stable since stopping Jardiance and increasing Mounjaro. Still above goal  Rationale for plan: Above goal, increase Mounjaro.    Medication Changes:  CONTINUE:  Lantus 15 units nightly  Metformin  mg two tablets twice daily  INCREASE  Mounjaro 7.5 to 10 mg once weekly    Future Considerations:  Restart Jardiance

## 2024-12-23 NOTE — PROGRESS NOTES
Clinical Pharmacy Appointment    Patient ID: Juan Mackey is a 49 y.o. male who presents for Diabetes.    Pt is here for Follow Up appointment.     Referring Provider/PCP: Lorie Crawford MD and Juan Roy DO   Last visit with PCP: 9/20/24   Next visit with PCP: 2/4/2025    Subjective     HPI  DIABETES MELLITUS TYPE II:    Diagnosed with diabetes: 2019. Known diabetic complications: None.  Does patient follow with Endocrinology: Not for DM  Last optometry exam: This year  Most recent visit in Podiatry: April 2024     Current diabetic medications include:  Lantus 15 units nightly  Metformin  mg 2 tablets twice daily  Mounjaro 7.5 on Tuesdays x 3    Clarifications to above regimen: None  Adverse Effects: None    Past diabetic medications include:  Jardiance 10 mg daily- Patient stopped on own  Metformin 1000 mg twice daily- GI tract/Diarrhea    Glucose Readings:  Glucometer/CGM Type: Glucometer  Patient tests BG 1 times per day most days    Current home BG readings: 130s  Previous home BG readings:   121-130  119-126  185 in the morning     Any episodes of hypoglycemia? No,   .  Did patient treat episode of hypoglycemia appropriately? N/A  Does the patient have a prescription for ready-to-use Glucagon? No    Does pt have proteinuria? No    Lifestyle: (Lower appetite)  Diet: not discussed meals/day.  BK: unknown  LN: unknown  DN: unknown  Snacks: unknown  Drinks: unknown  Physical Activity: unknown    Secondary Prevention:  Statin? No  ACE-I/ARB? Yes  Aspirin? No    Pertinent PMH Review:  PMH of Pancreatitis: No  PMH of Retinopathy: No  PMH of Urinary Tract Infections: No  PMH of MTC: No    Medication Reconciliation:  Changed:   Added:   Discontinued:     Drug Interactions  No relevant drug interactions were noted.    Medication System Management  Patients preferred [facility]: [facility]  Adherence/Organization: No issues reported  Affordability/Accessibility: No issues reported      Objective   Allergies  "  Allergen Reactions    Shrimp Angioedema    Oxycodone Dizziness    Shellfish Containing Products Unknown     Social History     Social History Narrative    Not on file      Medication Review  Current Outpatient Medications   Medication Instructions    albuterol 90 mcg/actuation inhaler 2 puffs, 4 times daily PRN    Autolet lancing device Use as instructed    bacitracin 500 unit/gram ointment Apply inside the nose 3 times a day for 3 weeks    empagliflozin (JARDIANCE) 10 mg, oral, Daily    insulin syringe,safetyneedle 29G X 1/2\" 0.5 mL syringe Use to inject 1-4 times daily as directed.    Lantus Solostar U-100 Insulin 15 Units, subcutaneous, Nightly, Take as directed per insulin instructions.    levothyroxine (SYNTHROID, LEVOXYL) 150 mcg, oral, Daily before breakfast    lisinopril 2.5 mg, oral, Daily    metFORMIN XR (GLUCOPHAGE-XR) 1,000 mg, oral, 2 times daily (morning and late afternoon), Do not crush, chew, or split.    Mounjaro 10 mg, subcutaneous, Every 7 days    sildenafil (VIAGRA) 50 mg, oral, Daily PRN, 50 mg once daily as needed 1 hour before sexual activity. May increase to a maximum dose of 100 mg once daily if there is incomplete response.      Vitals  BP Readings from Last 2 Encounters:   11/18/24 125/73   09/20/24 122/78     BMI Readings from Last 1 Encounters:   11/18/24 32.42 kg/m²      Labs  A1C  Lab Results   Component Value Date    HGBA1C 12.4 (H) 09/20/2024    HGBA1C 11.4 (H) 05/25/2024    HGBA1C 13.5 (H) 02/17/2024     BMP  Lab Results   Component Value Date    CALCIUM 9.0 09/20/2024     09/20/2024    K 3.9 09/20/2024    CO2 25 09/20/2024     09/20/2024    BUN 11 09/20/2024    CREATININE 0.98 09/20/2024    EGFR >90 09/20/2024     LFTs  Lab Results   Component Value Date    ALT 47 07/20/2020    AST 23 07/20/2020    ALKPHOS 93 07/20/2020    BILITOT 0.4 07/20/2020     FLP  Lab Results   Component Value Date    TRIG 142 09/20/2024    CHOL 206 (H) 09/20/2024    LDLCALC 136 (H) 09/20/2024 "    HDL 41.6 09/20/2024     Urine Microalbumin  Lab Results   Component Value Date    MICROALBCREA 4.8 05/25/2024     Weight Management  Wt Readings from Last 3 Encounters:   11/18/24 93.9 kg (207 lb)   09/20/24 93.4 kg (205 lb 14.4 oz)   08/23/24 93.3 kg (205 lb 9.6 oz)      There is no height or weight on file to calculate BMI.     Assessment/Plan   Problem List Items Addressed This Visit       Diabetes mellitus without complication (Multi)     Patient's goal A1c is < 7%.  Is pt at goal? No, 12.4  Patient's SMBGs are fasting stable at 130's. Sugars stable since stopping Jardiance and increasing Mounjaro. Still above goal  Rationale for plan: Above goal, increase Mounjaro.    Medication Changes:  CONTINUE:  Lantus 15 units nightly  Metformin  mg two tablets twice daily  INCREASE  Mounjaro 7.5 to 10 mg once weekly    Future Considerations:  Restart Jardiance         Relevant Medications    tirzepatide (Mounjaro) 10 mg/0.5 mL pen injector    Other Relevant Orders    Referral to Clinical Pharmacy     Other Visit Diagnoses       Type 2 diabetes mellitus without complication, without long-term current use of insulin (Multi)        Relevant Medications    tirzepatide (Mounjaro) 10 mg/0.5 mL pen injector    Other Relevant Orders    Referral to Clinical Pharmacy            Clinical Pharmacist follow-up: 1/13 at 1140, Telehealth visit    Continue all meds under the continuation of care with the referring provider and clinical pharmacy team.    Thank you,  Sofia Dorman, PharmD, [facility]  Clinical Pharmacy Specialist  [phone]    Verbal consent to manage patient's drug therapy was obtained from the patient. They were informed they may decline to participate or withdraw from participation in pharmacy services at any time.

## 2025-01-10 ENCOUNTER — HOSPITAL ENCOUNTER (OUTPATIENT)
Dept: RADIOLOGY | Facility: HOSPITAL | Age: 50
Discharge: HOME | End: 2025-01-10
Payer: COMMERCIAL

## 2025-01-10 ENCOUNTER — OFFICE VISIT (OUTPATIENT)
Dept: ORTHOPEDIC SURGERY | Facility: CLINIC | Age: 50
End: 2025-01-10
Payer: COMMERCIAL

## 2025-01-10 VITALS — BODY MASS INDEX: 32.49 KG/M2 | WEIGHT: 207 LBS | HEIGHT: 67 IN

## 2025-01-10 DIAGNOSIS — M25.561 RIGHT KNEE PAIN, UNSPECIFIED CHRONICITY: ICD-10-CM

## 2025-01-10 DIAGNOSIS — M17.11 PRIMARY OSTEOARTHRITIS OF RIGHT KNEE: ICD-10-CM

## 2025-01-10 DIAGNOSIS — S83.91XA SPRAIN OF RIGHT KNEE/LEG, INITIAL ENCOUNTER: Primary | ICD-10-CM

## 2025-01-10 DIAGNOSIS — S83.91XA SPRAIN OF RIGHT KNEE/LEG, INITIAL ENCOUNTER: ICD-10-CM

## 2025-01-10 PROCEDURE — 99203 OFFICE O/P NEW LOW 30 MIN: CPT | Performed by: ORTHOPAEDIC SURGERY

## 2025-01-10 PROCEDURE — 73560 X-RAY EXAM OF KNEE 1 OR 2: CPT | Mod: RT

## 2025-01-10 PROCEDURE — 99213 OFFICE O/P EST LOW 20 MIN: CPT | Mod: 25 | Performed by: ORTHOPAEDIC SURGERY

## 2025-01-10 PROCEDURE — 2500000004 HC RX 250 GENERAL PHARMACY W/ HCPCS (ALT 636 FOR OP/ED): Performed by: ORTHOPAEDIC SURGERY

## 2025-01-10 PROCEDURE — 1036F TOBACCO NON-USER: CPT | Performed by: ORTHOPAEDIC SURGERY

## 2025-01-10 PROCEDURE — 4010F ACE/ARB THERAPY RXD/TAKEN: CPT | Performed by: ORTHOPAEDIC SURGERY

## 2025-01-10 PROCEDURE — 20610 DRAIN/INJ JOINT/BURSA W/O US: CPT | Mod: RT | Performed by: ORTHOPAEDIC SURGERY

## 2025-01-10 PROCEDURE — 3008F BODY MASS INDEX DOCD: CPT | Performed by: ORTHOPAEDIC SURGERY

## 2025-01-10 RX ORDER — LIDOCAINE HYDROCHLORIDE 10 MG/ML
1 INJECTION, SOLUTION INFILTRATION; PERINEURAL
Status: COMPLETED | OUTPATIENT
Start: 2025-01-10 | End: 2025-01-10

## 2025-01-10 RX ORDER — TRIAMCINOLONE ACETONIDE 40 MG/ML
40 INJECTION, SUSPENSION INTRA-ARTICULAR; INTRAMUSCULAR
Status: COMPLETED | OUTPATIENT
Start: 2025-01-10 | End: 2025-01-10

## 2025-01-10 RX ADMIN — LIDOCAINE HYDROCHLORIDE 1 ML: 10 INJECTION, SOLUTION INFILTRATION; PERINEURAL at 14:31

## 2025-01-10 RX ADMIN — TRIAMCINOLONE ACETONIDE 40 MG: 400 INJECTION, SUSPENSION INTRA-ARTICULAR; INTRAMUSCULAR at 14:31

## 2025-01-10 ASSESSMENT — ENCOUNTER SYMPTOMS
OCCASIONAL FEELINGS OF UNSTEADINESS: 0
LOSS OF SENSATION IN FEET: 0
DEPRESSION: 0

## 2025-01-10 ASSESSMENT — LIFESTYLE VARIABLES
HOW MANY STANDARD DRINKS CONTAINING ALCOHOL DO YOU HAVE ON A TYPICAL DAY: PATIENT DOES NOT DRINK
HOW OFTEN DO YOU HAVE A DRINK CONTAINING ALCOHOL: NEVER
AUDIT-C TOTAL SCORE: 0
SKIP TO QUESTIONS 9-10: 1
HOW OFTEN DO YOU HAVE SIX OR MORE DRINKS ON ONE OCCASION: NEVER

## 2025-01-10 ASSESSMENT — COLUMBIA-SUICIDE SEVERITY RATING SCALE - C-SSRS
1. IN THE PAST MONTH, HAVE YOU WISHED YOU WERE DEAD OR WISHED YOU COULD GO TO SLEEP AND NOT WAKE UP?: NO
6. HAVE YOU EVER DONE ANYTHING, STARTED TO DO ANYTHING, OR PREPARED TO DO ANYTHING TO END YOUR LIFE?: NO
2. HAVE YOU ACTUALLY HAD ANY THOUGHTS OF KILLING YOURSELF?: NO

## 2025-01-10 ASSESSMENT — PAIN SCALES - GENERAL
PAINLEVEL_OUTOF10: 10 - WORST POSSIBLE PAIN
PAINLEVEL_OUTOF10: 10-WORST PAIN EVER

## 2025-01-10 ASSESSMENT — PAIN - FUNCTIONAL ASSESSMENT: PAIN_FUNCTIONAL_ASSESSMENT: 0-10
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
## 2025-01-13 ENCOUNTER — APPOINTMENT (OUTPATIENT)
Dept: PHARMACY | Facility: HOSPITAL | Age: 50
End: 2025-01-13
Payer: COMMERCIAL

## 2025-01-13 DIAGNOSIS — E11.9 DIABETES MELLITUS WITHOUT COMPLICATION (MULTI): ICD-10-CM

## 2025-01-13 DIAGNOSIS — E11.9 TYPE 2 DIABETES MELLITUS WITHOUT COMPLICATION, WITHOUT LONG-TERM CURRENT USE OF INSULIN (MULTI): ICD-10-CM

## 2025-01-13 NOTE — PROGRESS NOTES
Clinical Pharmacy Appointment    Patient ID: Juan Mackey is a 49 y.o. male who presents for Diabetes.    Pt is here for Follow Up appointment.     Referring Provider/PCP: Lorie Crawford MD and Juan Roy DO   Last visit with PCP: 9/20/24   Next visit with PCP: 2/4/2025    Subjective     HPI  DIABETES MELLITUS TYPE II:    Diagnosed with diabetes: 2019. Known diabetic complications: None.  Does patient follow with Endocrinology: Not for DM  Last optometry exam: This year  Most recent visit in Podiatry: April 2024     Current diabetic medications include:  Lantus 15 units nightly  Metformin  mg 2 tablets twice daily  Mounjaro 10 on Tuesdays x 1    Clarifications to above regimen: None  Adverse Effects: Slight nausea on Friday    Past diabetic medications include:  Jardiance 10 mg daily- Patient stopped on own  Metformin 1000 mg twice daily- GI tract/Diarrhea    Glucose Readings:  Glucometer/CGM Type: Glucometer  Patient tests BG 1 times per day most days    Current home BG readings: 128-130  Previous home BG readings:   130s  121-130  119-126  185 in the morning     Any episodes of hypoglycemia? No,   .  Did patient treat episode of hypoglycemia appropriately? N/A  Does the patient have a prescription for ready-to-use Glucagon? No    Does pt have proteinuria? No    Lifestyle: (Lower appetite)  Diet: not discussed meals/day.  BK: unknown  LN: unknown  DN: unknown  Snacks: unknown  Drinks: unknown  Physical Activity: unknown    Secondary Prevention:  Statin? No  ACE-I/ARB? Yes  Aspirin? No    Pertinent PMH Review:  PMH of Pancreatitis: No  PMH of Retinopathy: No  PMH of Urinary Tract Infections: No  PMH of MTC: No    Medication Reconciliation:  Changed:   Added:   Discontinued:     Drug Interactions  No relevant drug interactions were noted.    Medication System Management  [facility]  Adherence/Organization: No issues reported  Affordability/Accessibility: No issues  "reported      Objective   Allergies   Allergen Reactions    Shrimp Angioedema    Oxycodone Dizziness    Shellfish Containing Products Unknown     Social History     Social History Narrative    Not on file      Medication Review  Current Outpatient Medications   Medication Instructions    albuterol 90 mcg/actuation inhaler 2 puffs, 4 times daily PRN    Autolet lancing device Use as instructed    bacitracin 500 unit/gram ointment Apply inside the nose 3 times a day for 3 weeks    empagliflozin (JARDIANCE) 10 mg, oral, Daily    insulin syringe,safetyneedle 29G X 1/2\" 0.5 mL syringe Use to inject 1-4 times daily as directed.    Lantus Solostar U-100 Insulin 15 Units, subcutaneous, Nightly, Take as directed per insulin instructions.    levothyroxine (SYNTHROID, LEVOXYL) 150 mcg, oral, Daily before breakfast    lisinopril 2.5 mg, oral, Daily    metFORMIN XR (GLUCOPHAGE-XR) 1,000 mg, oral, 2 times daily (morning and late afternoon), Do not crush, chew, or split.    Mounjaro 10 mg, subcutaneous, Every 7 days    sildenafil (VIAGRA) 50 mg, oral, Daily PRN, 50 mg once daily as needed 1 hour before sexual activity. May increase to a maximum dose of 100 mg once daily if there is incomplete response.      Vitals  BP Readings from Last 2 Encounters:   11/18/24 125/73   09/20/24 122/78     BMI Readings from Last 1 Encounters:   01/10/25 32.42 kg/m²      Labs  A1C  Lab Results   Component Value Date    HGBA1C 12.4 (H) 09/20/2024    HGBA1C 11.4 (H) 05/25/2024    HGBA1C 13.5 (H) 02/17/2024     BMP  Lab Results   Component Value Date    CALCIUM 9.0 09/20/2024     09/20/2024    K 3.9 09/20/2024    CO2 25 09/20/2024     09/20/2024    BUN 11 09/20/2024    CREATININE 0.98 09/20/2024    EGFR >90 09/20/2024     LFTs  Lab Results   Component Value Date    ALT 47 07/20/2020    AST 23 07/20/2020    ALKPHOS 93 07/20/2020    BILITOT 0.4 07/20/2020     FLP  Lab Results   Component Value Date    TRIG 142 09/20/2024    CHOL 206 (H) " 09/20/2024    LDLCALC 136 (H) 09/20/2024    HDL 41.6 09/20/2024     Urine Microalbumin  Lab Results   Component Value Date    MICROALBCREA 4.8 05/25/2024     Weight Management  Wt Readings from Last 3 Encounters:   01/10/25 93.9 kg (207 lb)   11/18/24 93.9 kg (207 lb)   09/20/24 93.4 kg (205 lb 14.4 oz)      There is no height or weight on file to calculate BMI.     Assessment/Plan   Problem List Items Addressed This Visit       Diabetes mellitus without complication (Multi)     Patient's goal A1c is < 7%.  Is pt at goal? No, 12.4  Patient's SMBGs are fasting stable at 130's. Sugars stable since stopping Jardiance and increasing Mounjaro. Tolerated first dose of 10 mg Mounjaro with no increase in side effects. Still above goal  Rationale for plan: Above goal, Continue Mounjaro 10 mg at this time.    Medication Changes:  CONTINUE:  Lantus 15 units nightly  Metformin  mg two tablets twice daily  Mounjaro 10 mg once weekly    Future Considerations:  Restart Jardiance         Relevant Orders    Referral to Clinical Pharmacy     Other Visit Diagnoses       Type 2 diabetes mellitus without complication, without long-term current use of insulin (Multi)        Relevant Orders    Referral to Clinical Pharmacy          Clinical Pharmacist follow-up: 2/10 at 1140, Telehealth visit    Continue all meds under the continuation of care with the referring provider and clinical pharmacy team.    Thank you,  Sofia Dorman, PharmD, [facility]  Clinical Pharmacy Specialist  [phone]    Verbal consent to manage patient's drug therapy was obtained from the patient. They were informed they may decline to participate or withdraw from participation in pharmacy services at any time.

## 2025-01-13 NOTE — ASSESSMENT & PLAN NOTE
Patient's goal A1c is < 7%.  Is pt at goal? No, 12.4  Patient's SMBGs are fasting stable at 130's. Sugars stable since stopping Jardiance and increasing Mounjaro. Tolerated first dose of 10 mg Mounjaro with no increase in side effects. Still above goal  Rationale for plan: Above goal, Continue Mounjaro 10 mg at this time.    Medication Changes:  CONTINUE:  Lantus 15 units nightly  Metformin  mg two tablets twice daily  Mounjaro 10 mg once weekly    Future Considerations:  Restart Jardiance

## 2025-01-13 NOTE — Clinical Note
Patient has tolerated increase to Mounjaro 10 mg well so far, sees you 2/4 so patient prefers to have visit and get new A1C before changing regimen at this time.

## 2025-02-04 ENCOUNTER — APPOINTMENT (OUTPATIENT)
Dept: PRIMARY CARE | Facility: CLINIC | Age: 50
End: 2025-02-04
Payer: COMMERCIAL

## 2025-02-10 ENCOUNTER — APPOINTMENT (OUTPATIENT)
Dept: PHARMACY | Facility: HOSPITAL | Age: 50
End: 2025-02-10
Payer: COMMERCIAL

## 2025-02-10 DIAGNOSIS — E11.9 DIABETES MELLITUS WITHOUT COMPLICATION (MULTI): ICD-10-CM

## 2025-02-10 DIAGNOSIS — E11.9 TYPE 2 DIABETES MELLITUS WITHOUT COMPLICATION, WITHOUT LONG-TERM CURRENT USE OF INSULIN (MULTI): ICD-10-CM

## 2025-02-10 NOTE — PROGRESS NOTES
Primary Care Clinical Pharmacist Follow-Up Visit    Date of Follow-Up Visit: 2/10/25  Date of Initial Visit: 10/7/24  Disease state(s) to be managed by clinical pharmacist: DMT2  Referring Provider: Dr. Lorie Crawford (PCP)  Most recent appointment with PCP: 9/20/24  Next appointment with PCP: not yet scheduled              Subjective:    Patient is a 50 YO M who presents virtually to visit with clinical pharmacist for follow-up visit for management of DMT2. Patient gives consent to have visit conducted via telehealth. Patient was referred to clinical pharmacist for management of disease state(s) by PCP. At last visit with clinical pharmacist, patient was instructed to continue Lantus SoloStar Pen 15 Units subcutaneous at bedtime and metformin XR 1000mg PO BID and temporarily stop therapy with Mounjaro 10mg subcutaneous weekly until hemoglobin A1C level rechecked (plan would be for patient to resume therapy with Mounjaro at either 10mg subcutaneous weekly or 12.5mg subcutaneous weekly depending on hemoglobin A1C result). At today's visit, patient reports taking Lantus SoloStar Pen 15 Units subcutaneous at bedtime and metformin XR 1000mg PO BID and reports compliance with meds and denies any ADRs to meds. Patient states that he was not able to get his hemoglobin A1C level checked since last visit with clinical pharmacist and that he has run out of supply of Mounjaro.    Patient reports checking glucose level at home but did not have home glucose readings available at today's visit with clinical pharmacist. Per patient, since last visit with clinical pharmacist, home glucose readings have ranged from 119-168 mg/dL.       Objective:    Most recent hemoglobin A1C level: 12.4% (9/20/24) (goal: less than 7%)    Most recent eGFR: greater than 90  (9/20/24)  Most recent vitamin B12 level:  none on file for the  past 12 months  Most recent Albumin/SCr ratio: 4.8  (5/25/24)  Most recent AST/ALT:  none on file for the past 12 months  Most recent diabetic retinopathy exam:  10/22/24 (no diabetic retinopathy in either eye)  Most recent influenza vaccine: 10/2/23  Most recent pneumococcal vaccine:  none on file       Below are the results of patient's most recent fasting lipid panel (from 9/20/24):    LDL:  136   (goal: less than 100 )  TG : 142  (goal: less than 500)         HDL:  41.6  (goal: greater than 40)    TC: 206 (goal: less than 200)       Assessment:    Patient's DMT2 is currently uncontrolled based on most recent hemoglobin A1C level of  12.4% from 9/20/24. Patient currently on  Lantus SoloStar Pen 15 Units subcutaneous at bedtime and metformin XR 1000mg PO BID and reports compliance with meds and denies any ADRs to meds. Of note, patient was previously on Mounjaro 10mg subcutaneous weekly but was instructed to stop med and subsequently have hemoglobin A1C level checked to determine whether patient should keep on taking dose of 10mg subcutaneous weekly or increase dose to 12.5mg subcutaneous weekly-however patient was unable to have hemoglobin A1C checked and ran out of med. Patient checks glucose level at home but did not have readings available at today's visit with clinical pharmacist.    Plan:  - Resume Mounjaro 10mg subcutaneous weekly (instructed patient to request refill of med, patient was unaware that he had refills remaining on prescription)  - Continue Lantus SoloStar Pen 15 Units subcutaneous at bedtime  - Continue metformin XR 1000mg PO BID  - Continue checking glucose level at home and have readings available at next visit with clinical pharmacist          - Instructed patient to inform clinical pharmacist if he ever obtains a glucose reading of less than 70 mg/dL  - Instructed patient to go to lab on 3/1/25 to have hemoglobin A1C, CMP, lipid panel, and vitamin B12 level checked         The patient  verbalized understanding of everything discussed at today's visit and agrees with plan formulated by clinical pharmacist    Next visit with clinical pharmacist:  3/7/25 at 12 PM via telehealth      Continue all meds under the continuation of care with the referring provider and clinical pharmacy team.    Patient Education    The patient was counseled on the following regarding DMT2:    - The side effects of the medications patient uses to treat disease state(s) and what to do if they occur    - How to correct hypoglycemia, if it ever occurs      TYLER Rice, PharmD, CPh, BCACP  Clinical Pharmacy Specialist, [facility]

## 2025-02-11 NOTE — ASSESSMENT & PLAN NOTE
Assessment:    Patient's DMT2 is currently uncontrolled based on most recent hemoglobin A1C level of  12.4% from 9/20/24. Patient currently on  Lantus SoloStar Pen 15 Units subcutaneous at bedtime and metformin XR 1000mg PO BID and reports compliance with meds and denies any ADRs to meds. Of note, patient was previously on Mounjaro 10mg subcutaneous weekly but was instructed to stop med and subsequently have hemoglobin A1C level checked to determine whether patient should keep on taking dose of 10mg subcutaneous weekly or increase dose to 12.5mg subcutaneous weekly-however patient was unable to have hemoglobin A1C checked and ran out of med. Patient checks glucose level at home but did not have readings available at today's visit with clinical pharmacist.    Plan:  - Resume Mounjaro 10mg subcutaneous weekly (instructed patient to request refill of med, patient was unaware that he had refills remaining on prescription)  - Continue Lantus SoloStar Pen 15 Units subcutaneous at bedtime  - Continue metformin XR 1000mg PO BID  - Continue checking glucose level at home and have readings available at next visit with clinical pharmacist          - Instructed patient to inform clinical pharmacist if he ever obtains a glucose reading of less than 70 mg/dL  - Instructed patient to go to lab on 3/1/25 to have hemoglobin A1C, CMP, lipid panel, and vitamin B12 level checked

## 2025-02-20 ENCOUNTER — OFFICE VISIT (OUTPATIENT)
Dept: URGENT CARE | Age: 50
End: 2025-02-20
Payer: COMMERCIAL

## 2025-02-20 VITALS
WEIGHT: 210 LBS | DIASTOLIC BLOOD PRESSURE: 76 MMHG | SYSTOLIC BLOOD PRESSURE: 119 MMHG | TEMPERATURE: 97.6 F | BODY MASS INDEX: 32.96 KG/M2 | HEART RATE: 105 BPM | OXYGEN SATURATION: 95 % | RESPIRATION RATE: 20 BRPM | HEIGHT: 67 IN

## 2025-02-20 DIAGNOSIS — J10.1 INFLUENZA A: Primary | ICD-10-CM

## 2025-02-20 DIAGNOSIS — R68.89 FLU-LIKE SYMPTOMS: ICD-10-CM

## 2025-02-20 LAB
POC RAPID INFLUENZA A: POSITIVE
POC RAPID INFLUENZA B: NEGATIVE
POC SARS-COV-2 AG BINAX: NORMAL

## 2025-02-20 RX ORDER — BROMPHENIRAMINE MALEATE, PSEUDOEPHEDRINE HYDROCHLORIDE, AND DEXTROMETHORPHAN HYDROBROMIDE 2; 30; 10 MG/5ML; MG/5ML; MG/5ML
10 SYRUP ORAL 4 TIMES DAILY PRN
Qty: 250 ML | Refills: 0 | Status: SHIPPED | OUTPATIENT
Start: 2025-02-20 | End: 2025-03-02

## 2025-02-20 ASSESSMENT — ENCOUNTER SYMPTOMS: COUGH: 1

## 2025-02-20 NOTE — PROGRESS NOTES
"Subjective   Patient ID: Juan Mackey is a 49 y.o. male. They present today with a chief complaint of Cough (Cough, body aches/chills, runny nose, diarrhea x 3 days.).    History of Present Illness  Patient is a pleasant 49-year-old -American male, past medical history of diabetes, presenting to the clinic with chief complaint of flulike symptoms.  Patient is reporting 2-day history of upper respiratory congestion rhinorrhea postnasal drainage, body aches, fatigue, and malaise.  Also reporting a dry cough.  No sputum production.  Reports subjective fevers at home.  He has been using over-the-counter decongestant home with minimal relief.  He denies any chest pain or shortness of breath.  No abdominal pain, nausea, vomiting.  No numbness tingling or focal weakness.  No rashes.      Cough        Past Medical History  Allergies as of 02/20/2025 - Reviewed 02/20/2025   Allergen Reaction Noted    Shrimp Angioedema 05/10/2023    Oxycodone Dizziness 05/10/2023    Shellfish containing products Unknown 09/20/2024       (Not in a hospital admission)         No past medical history on file.    Past Surgical History:   Procedure Laterality Date    OTHER SURGICAL HISTORY  08/28/2020    Appendectomy    OTHER SURGICAL HISTORY  07/19/2022    Corneal lasik        reports that he has never smoked. He has never been exposed to tobacco smoke. He has never used smokeless tobacco. He reports current alcohol use. He reports that he does not currently use drugs after having used the following drugs: Marijuana.    Review of Systems  Review of Systems   Respiratory:  Positive for cough.                                   Objective    Vitals:    02/20/25 1007   BP: 119/76   BP Location: Left arm   Patient Position: Sitting   BP Cuff Size: Large adult   Pulse: 105   Resp: 20   Temp: 36.4 °C (97.6 °F)   TempSrc: Temporal   SpO2: 95%   Weight: 95.3 kg (210 lb)   Height: 1.702 m (5' 7\")     No LMP for male patient.    Physical " Exam  General: Vitals Noted. No distress. Normocephalic.     HEENT: TMs normal, EOMI, normal conjunctiva, patent nares with erythematous edematous and appear nasal turbinates and clear rhinorrhea bilaterally.  Posterior oropharynx with signs of postnasal drainage without any erythema swelling or tonsillar exudate.  Uvula is in the midline and nonedematous.  No drooling.  No trismus.    Neck: Supple with no adenopathy.     Cardiac: Regular Rate and Rhythm. No murmur.     Pulmonary: Equal breath sounds bilaterally. No wheezes, rhonchi, or rales.    Abdomen: Soft, non-tender, with normal bowel sounds.     Musculoskeletal: Moves all extremities, no effusion, no edema.     Skin: No obvious rashes.    Procedures    Point of Care Test & Imaging Results from this visit  Results for orders placed or performed in visit on 02/20/25   POCT Influenza A/B manually resulted    Collection Time: 02/20/25 10:14 AM   Result Value Ref Range    POC Rapid Influenza A Positive (A) Negative    POC Rapid Influenza B Negative Negative   POCT Covid-19 Rapid Antigen    Collection Time: 02/20/25 10:15 AM   Result Value Ref Range    POC SOHAIL-COV-2 AG  Presumptive negative test for SARS-CoV-2 (no antigen detected)     Presumptive negative test for SARS-CoV-2 (no antigen detected)      No results found.    Diagnostic study results (if any) were reviewed by Christiano Gandara PA-C.    Assessment/Plan   Allergies, medications, history, and pertinent labs/EKGs/Imaging reviewed by Christiano Gandara PA-C.     Medical Decision Making  Patient was seen eval in the clinic for to complaint of flulike symptoms.  On exam patient is nontoxic well-appearing respite comfortably no acute distress.  Vital signs are stable, afebrile.  Chest is clear, heart is regular, belly is diffusely soft and nontender with no guarding rebound or rigidity.  ENT exam as above consistent with a viral illness.  Rapid COVID-19 and influenza testing were performed and returned  positive for influenza A.  Patient's history and physical exam very consistent with viral illness.  Minimal concern for otitis media acute sinusitis or pneumonia at this time given duration of symptoms.  Will provide Bromfed-DM to use as needed for his cough and congestion advised to drink plenty of clear fluids, rest, and use Tylenol or Profen as needed for his body aches.  Advise follow-up with primary care physician in the next week.  Reviewed my impression, plan, strict return was report to ED precautions with the patient.  He expresses understanding and agreement plan of care.    Orders and Diagnoses  Diagnoses and all orders for this visit:  Influenza A  -     brompheniramine-pseudoeph-DM 2-30-10 mg/5 mL syrup; Take 10 mL by mouth 4 times a day as needed for congestion or cough for up to 10 days.  Flu-like symptoms  -     POCT Influenza A/B manually resulted  -     POCT Covid-19 Rapid Antigen        Medical Admin Record      Follow Up Instructions  No follow-ups on file.    Patient disposition: Home    Electronically signed by Christiano Gandara PA-C  10:17 AM

## 2025-02-20 NOTE — LETTER
February 20, 2025     Patient: Juan Mackey   YOB: 1975   Date of Visit: 2/20/2025       To Whom It May Concern:    It is my medical opinion that Juan Mackey should remain out of work until fever free for 24 hours without use of tylenol or ibuprofen .    If you have any questions or concerns, please don't hesitate to call.         Sincerely,        Christiano Gandara PA-C    CC: No Recipients

## 2025-02-21 ENCOUNTER — DOCUMENTATION (OUTPATIENT)
Facility: HOSPITAL | Age: 50
End: 2025-02-21
Payer: COMMERCIAL

## 2025-02-21 ENCOUNTER — APPOINTMENT (OUTPATIENT)
Dept: ORTHOPEDIC SURGERY | Facility: CLINIC | Age: 50
End: 2025-02-21
Payer: COMMERCIAL

## 2025-02-21 DIAGNOSIS — E66.811 CLASS 1 OBESITY WITH SERIOUS COMORBIDITY AND BODY MASS INDEX (BMI) OF 32.0 TO 32.9 IN ADULT, UNSPECIFIED OBESITY TYPE: Primary | ICD-10-CM

## 2025-02-21 DIAGNOSIS — E04.1 THYROID NODULE: ICD-10-CM

## 2025-02-21 DIAGNOSIS — Z91.89 10 YEAR RISK OF MI OR STROKE 7.5% OR GREATER: ICD-10-CM
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
## 2025-02-28 ENCOUNTER — OFFICE VISIT (OUTPATIENT)
Dept: ORTHOPEDIC SURGERY | Facility: CLINIC | Age: 50
End: 2025-02-28
Payer: COMMERCIAL

## 2025-02-28 VITALS — BODY MASS INDEX: 32.18 KG/M2 | WEIGHT: 205 LBS | HEIGHT: 67 IN

## 2025-02-28 DIAGNOSIS — S83.91XA SPRAIN OF RIGHT KNEE/LEG, INITIAL ENCOUNTER: ICD-10-CM

## 2025-02-28 DIAGNOSIS — M25.561 RIGHT KNEE PAIN, UNSPECIFIED CHRONICITY: Primary | ICD-10-CM

## 2025-02-28 DIAGNOSIS — M17.11 PRIMARY OSTEOARTHRITIS OF RIGHT KNEE: ICD-10-CM

## 2025-02-28 PROCEDURE — 99213 OFFICE O/P EST LOW 20 MIN: CPT | Performed by: PHYSICIAN ASSISTANT

## 2025-02-28 PROCEDURE — 3008F BODY MASS INDEX DOCD: CPT | Performed by: PHYSICIAN ASSISTANT

## 2025-02-28 PROCEDURE — 4010F ACE/ARB THERAPY RXD/TAKEN: CPT | Performed by: PHYSICIAN ASSISTANT

## 2025-02-28 PROCEDURE — 1036F TOBACCO NON-USER: CPT | Performed by: PHYSICIAN ASSISTANT

## 2025-02-28 ASSESSMENT — LIFESTYLE VARIABLES
HOW OFTEN DURING THE LAST YEAR HAVE YOU BEEN UNABLE TO REMEMBER WHAT HAPPENED THE NIGHT BEFORE BECAUSE YOU HAD BEEN DRINKING: NEVER
HOW OFTEN DO YOU HAVE SIX OR MORE DRINKS ON ONE OCCASION: NEVER
SKIP TO QUESTIONS 9-10: 1
AUDIT TOTAL SCORE: 2
AUDIT-C TOTAL SCORE: 2
HOW OFTEN DURING THE LAST YEAR HAVE YOU HAD A FEELING OF GUILT OR REMORSE AFTER DRINKING: NEVER
HAVE YOU OR SOMEONE ELSE BEEN INJURED AS A RESULT OF YOUR DRINKING: NO
HOW OFTEN DURING THE LAST YEAR HAVE YOU FAILED TO DO WHAT WAS NORMALLY EXPECTED FROM YOU BECAUSE OF DRINKING: NEVER
HOW OFTEN DURING THE LAST YEAR HAVE YOU NEEDED AN ALCOHOLIC DRINK FIRST THING IN THE MORNING TO GET YOURSELF GOING AFTER A NIGHT OF HEAVY DRINKING: NEVER
HOW OFTEN DURING THE LAST YEAR HAVE YOU FOUND THAT YOU WERE NOT ABLE TO STOP DRINKING ONCE YOU HAD STARTED: NEVER
HOW OFTEN DO YOU HAVE A DRINK CONTAINING ALCOHOL: 2-4 TIMES A MONTH
HOW MANY STANDARD DRINKS CONTAINING ALCOHOL DO YOU HAVE ON A TYPICAL DAY: 1 OR 2
HAS A RELATIVE, FRIEND, DOCTOR, OR ANOTHER HEALTH PROFESSIONAL EXPRESSED CONCERN ABOUT YOUR DRINKING OR SUGGESTED YOU CUT DOWN: NO

## 2025-02-28 ASSESSMENT — ENCOUNTER SYMPTOMS
DEPRESSION: 0
LOSS OF SENSATION IN FEET: 0
OCCASIONAL FEELINGS OF UNSTEADINESS: 0

## 2025-02-28 ASSESSMENT — COLUMBIA-SUICIDE SEVERITY RATING SCALE - C-SSRS
6. HAVE YOU EVER DONE ANYTHING, STARTED TO DO ANYTHING, OR PREPARED TO DO ANYTHING TO END YOUR LIFE?: NO
1. IN THE PAST MONTH, HAVE YOU WISHED YOU WERE DEAD OR WISHED YOU COULD GO TO SLEEP AND NOT WAKE UP?: NO
2. HAVE YOU ACTUALLY HAD ANY THOUGHTS OF KILLING YOURSELF?: NO

## 2025-02-28 ASSESSMENT — PATIENT HEALTH QUESTIONNAIRE - PHQ9
2. FEELING DOWN, DEPRESSED OR HOPELESS: NOT AT ALL
1. LITTLE INTEREST OR PLEASURE IN DOING THINGS: NOT AT ALL
SUM OF ALL RESPONSES TO PHQ9 QUESTIONS 1 AND 2: 0

## 2025-02-28 ASSESSMENT — PAIN SCALES - GENERAL: PAINLEVEL_OUTOF10: 0-NO PAIN

## 2025-02-28 ASSESSMENT — PAIN - FUNCTIONAL ASSESSMENT: PAIN_FUNCTIONAL_ASSESSMENT: NO/DENIES PAIN

## 2025-02-28 NOTE — LETTER
February 28, 2025     Patient: Juan Mackey   YOB: 1975   Date of Visit: 2/28/2025       To Whom It May Concern:      Please excuse Mr.Anthony Mackey from work today due to a medical appointment.  If you have any questions or concerns, please don't hesitate to call.         Sincerely,        Suzette Blum PA-C    CC: No Recipients

## 2025-02-28 NOTE — PATIENT INSTRUCTIONS
Thank you for coming to see us today!     Continue to use tylenol for pain control.   Rest, ice and elevate and remember to do exercises.     Follow up  mid April

## 2025-03-02 LAB
ALBUMIN SERPL-MCNC: 4.1 G/DL (ref 3.6–5.1)
ALP SERPL-CCNC: 72 U/L (ref 35–144)
ALT SERPL-CCNC: 32 U/L (ref 9–46)
ANION GAP SERPL CALCULATED.4IONS-SCNC: 6 MMOL/L (CALC) (ref 7–17)
AST SERPL-CCNC: 21 U/L (ref 10–35)
BILIRUB SERPL-MCNC: 0.4 MG/DL (ref 0.2–1.2)
BUN SERPL-MCNC: 12 MG/DL (ref 7–25)
CALCIUM SERPL-MCNC: 9.2 MG/DL (ref 8.6–10.3)
CHLORIDE SERPL-SCNC: 107 MMOL/L (ref 98–110)
CHOLEST SERPL-MCNC: 202 MG/DL
CHOLEST/HDLC SERPL: 4.5 (CALC)
CO2 SERPL-SCNC: 27 MMOL/L (ref 20–32)
CREAT SERPL-MCNC: 0.89 MG/DL (ref 0.7–1.3)
EGFRCR SERPLBLD CKD-EPI 2021: 104 ML/MIN/1.73M2
EST. AVERAGE GLUCOSE BLD GHB EST-MCNC: 177 MG/DL
EST. AVERAGE GLUCOSE BLD GHB EST-SCNC: 9.8 MMOL/L
GLUCOSE SERPL-MCNC: 116 MG/DL (ref 65–99)
HBA1C MFR BLD: 7.8 % OF TOTAL HGB
HDLC SERPL-MCNC: 45 MG/DL
LDLC SERPL CALC-MCNC: 137 MG/DL (CALC)
NONHDLC SERPL-MCNC: 157 MG/DL (CALC)
POTASSIUM SERPL-SCNC: 4.2 MMOL/L (ref 3.5–5.3)
PROT SERPL-MCNC: 7 G/DL (ref 6.1–8.1)
SODIUM SERPL-SCNC: 140 MMOL/L (ref 135–146)
TRIGL SERPL-MCNC: 95 MG/DL
VIT B12 SERPL-MCNC: 454 PG/ML (ref 200–1100)

## 2025-03-07 ENCOUNTER — APPOINTMENT (OUTPATIENT)
Dept: PHARMACY | Facility: HOSPITAL | Age: 50
End: 2025-03-07
Payer: COMMERCIAL

## 2025-03-07 DIAGNOSIS — E11.9 TYPE 2 DIABETES MELLITUS WITHOUT COMPLICATION, WITHOUT LONG-TERM CURRENT USE OF INSULIN (MULTI): ICD-10-CM

## 2025-03-07 NOTE — ASSESSMENT & PLAN NOTE
Assessment:    Patient's DMT2 is currently uncontrolled based on most recent hemoglobin A1C level of  7.8% from 3/1/25. Patient currently on metformin XR 500mg PO daily (maximum tolerable dose of med), Mounjaro 10mg subcutaneous weekly, and Lantus SoloStar Pen 15 Units subcutaneous at bedtime. Patient checks glucose level at home but did not have readings available at today's visit with clinical pharmacist.    Plan:  - Continue metformin XR 500mg PO daily (maximum tolerable dose)  - Continue Mounjaro 10mg subcutaneous weekly   - Continue Lantus SoloStar Pen 15 Units subcutaneous at bedtime  - Continue checking glucose level at home and have readings available at next visit with clinical pharmacist

## 2025-03-07 NOTE — PROGRESS NOTES
Primary Care Clinical Pharmacist Follow-Up Visit    Date of Follow-Up Visit: 3/7/25  Date of Initial Visit: 10/7/24  Disease state(s) to be managed by clinical pharmacist: DMT2  Referring Provider: Dr. Lorie Crawford (PCP)  Most recent appointment with PCP: 9/20/24  Next appointment with PCP: not yet scheduled              Subjective:    Patient is a 50 YO M who presents virtually to visit with clinical pharmacist for follow-up visit for management of DMT2. Patient gives consent to have visit conducted via telehealth. Patient was referred to clinical pharmacist for management of disease state(s) by PCP. At last visit with clinical pharmacist, patient was instructed to resume Mounjaro 10mg subcutaneous weekly, continue Lantus SoloStar Pen 15 Units subcutaneous at bedtime, and continue metformin XR 1000mg PO BID. At today's visit, patient reports having resumed therapy with Mounjaro 10mg subcutaneous weekly on 2/12/25 as well as taking Lantus SoloStar Pen 15 Units subcutaneous at bedtime. Patient also reports self-decreasing metformin XR dose from 500mg PO BID (states was always taking this amount, not 1000mg PO BID as reported at last visit) to 500mg PO daily about a week ago in response to experiencing diarrhea while taking 500mg PO BID. Patient reports compliance with Mounjaro and Lantus SoloStar Pen and states that he has taken metformin XR every day since last visit. Patient reports tolerating these meds at these doses.     Patient reports checking glucose level at home but did not have home glucose readings available at today's visit with clinical pharmacist. Per patient, since last visit with clinical pharmacist, home glucose readings have ranged from 119-136 mg/dL.         Objective:    Most recent hemoglobin A1C level: 7.8% (3/1/25) (goal: less than 7%)    Most recent eGFR: 104 (3/1/25)  Most recent  vitamin B12 level:  454 (3/1/25)  Most recent Albumin/SCr ratio: 4.8  (5/25/24)  Most recent AST/ALT: 21/32 (3/1/25)  Most recent diabetic retinopathy exam:  10/22/24 (no diabetic retinopathy in either eye)  Most recent influenza vaccine: 10/26/24  Most recent pneumococcal vaccine:  none on file       Below are the results of patient's most recent fasting lipid panel (from 3/1/25):    LDL:  137 (goal: less than 100 )  TG : 95  (goal: less than 500)         HDL:  45  (goal: greater than 40)    TC: 202 (goal: less than 200)     **The results of the above lipid panel are reflective of patient not taking a cholesterol-lowering medication. Patient is currently not taking a cholesterol-lowering medication and states he does not want to**    Assessment:    Patient's DMT2 is currently uncontrolled based on most recent hemoglobin A1C level of  7.8% from 3/1/25. Patient currently on metformin XR 500mg PO daily (maximum tolerable dose of med), Mounjaro 10mg subcutaneous weekly, and Lantus SoloStar Pen 15 Units subcutaneous at bedtime. Patient checks glucose level at home but did not have readings available at today's visit with clinical pharmacist.    Plan:  - Continue metformin XR 500mg PO daily (maximum tolerable dose)  - Continue Mounjaro 10mg subcutaneous weekly   - Continue Lantus SoloStar Pen 15 Units subcutaneous at bedtime  - Continue checking glucose level at home and have readings available at next visit with clinical pharmacist      The patient verbalized understanding of everything discussed at today's visit and agrees with plan formulated by clinical pharmacist    Next visit with clinical pharmacist:  3/21/25 at 12 PM via telehealth      Continue all meds under the continuation of care with the referring provider and clinical pharmacy team.    Patient Education    The patient is aware of the following regarding DMT2:    - The side effects of the medications patient uses to treat disease state(s) and what to do if  they occur    - How to correct hypoglycemia, if it ever occurs      TYLER Rice, PharmD, CPh, BCACP  Clinical Pharmacy Specialist, [facility]

## 2025-03-21 ENCOUNTER — APPOINTMENT (OUTPATIENT)
Dept: PHARMACY | Facility: HOSPITAL | Age: 50
End: 2025-03-21
Payer: COMMERCIAL

## 2025-03-21 DIAGNOSIS — E11.9 TYPE 2 DIABETES MELLITUS WITHOUT COMPLICATION, WITHOUT LONG-TERM CURRENT USE OF INSULIN (MULTI): ICD-10-CM

## 2025-03-21 NOTE — PROGRESS NOTES
Primary Care Clinical Pharmacist Follow-Up Visit    Date of Follow-Up Visit: 3/21/25  Date of Initial Visit: 10/7/24  Disease state(s) to be managed by clinical pharmacist: DMT2  Referring Provider: Dr. Lorie Crawford (PCP)  Most recent appointment with PCP: 9/20/24  Next appointment with PCP: not yet scheduled              Subjective:    Patient is a 50 YO M who presents virtually to visit with clinical pharmacist for follow-up visit for management of DMT2. Patient gives consent to have visit conducted via telehealth. Patient was referred to clinical pharmacist for management of disease state(s) by PCP. At last visit with clinical pharmacist, patient was instructed to continue metformin XR 500mg PO daily (maximum tolerable dose), Mounjaro 10mg subcutaneous weekly, and Lantus SoloStar Pen 15 Units subcutaneous at bedtime. At today's visit, patient reports taking metformin XR 500mg PO daily, Mounjaro 10mg subcutaneous weekly, and Lantus SoloStar Pen 15 Units subcutaneous at bedtime and reports compliance with meds and reports tolerating these meds at these doses.     Patient reports checking glucose level at home but did not have home glucose readings available at today's visit with clinical pharmacist. Per patient, since last visit with clinical pharmacist, home glucose level has been checked only twice (once a week) and was 107 mg/dL on 3/14/25 and 99 mg/dL today (both readings morning fasting).     Objective:    Most recent hemoglobin A1C level: 7.8% (3/1/25) (goal: less than 7%)    Most recent eGFR: 104 (3/1/25)  Most recent vitamin B12 level:  454 (3/1/25)  Most recent Albumin/SCr ratio: 4.8  (5/25/24)  Most recent AST/ALT: 21/32 (3/1/25)  Most recent diabetic retinopathy exam:  10/22/24 (no diabetic retinopathy in either eye)  Most recent influenza vaccine: 10/26/24  Most recent pneumococcal vaccine:   none on file       Below are the results of patient's most recent fasting lipid panel (from 3/1/25):    LDL:  137 (goal: less than 100 )  TG : 95  (goal: less than 500)         HDL:  45  (goal: greater than 40)    TC: 202 (goal: less than 200)     **The results of the above lipid panel are reflective of patient not taking a cholesterol-lowering medication. Patient is currently not taking a cholesterol-lowering medication and states he does not want to**    Assessment:    Patient's DMT2 is currently uncontrolled based on most recent hemoglobin A1C level of  7.8% from 3/1/25. Patient currently on metformin XR 500mg PO daily (maximum tolerable dose of med), Mounjaro 10mg subcutaneous weekly, and Lantus SoloStar Pen 15 Units subcutaneous at bedtime. Patient checks glucose level at home (only once weekly) and did not have readings available at today's visit with clinical pharmacist.    Plan:  - Continue metformin XR 500mg PO daily (maximum tolerable dose)  - Continue Mounjaro 10mg subcutaneous weekly   - Continue Lantus SoloStar Pen 15 Units subcutaneous at bedtime  - Instructed patient to check glucose level at home every day in the morning before eating or drinking anything and have readings available at next visit with clinical pharmacist      The patient verbalized understanding of everything discussed at today's visit and agrees with plan formulated by clinical pharmacist    Next visit with clinical pharmacist:  4/4/25 at 12:20 PM via telehealth      Continue all meds under the continuation of care with the referring provider and clinical pharmacy team.    Patient Education    The patient is aware of the following regarding DMT2:    - The side effects of the medications patient uses to treat disease state(s) and what to do if they occur    - How to correct hypoglycemia, if it ever occurs      TYLER Rice, PharmD, CPh, BCACP  Clinical Pharmacy Specialist, [facility]

## 2025-03-21 NOTE — ASSESSMENT & PLAN NOTE
Assessment:    Patient's DMT2 is currently uncontrolled based on most recent hemoglobin A1C level of  7.8% from 3/1/25. Patient currently on metformin XR 500mg PO daily (maximum tolerable dose of med), Mounjaro 10mg subcutaneous weekly, and Lantus SoloStar Pen 15 Units subcutaneous at bedtime. Patient checks glucose level at home (only once weekly) and did not have readings available at today's visit with clinical pharmacist.    Plan:  - Continue metformin XR 500mg PO daily (maximum tolerable dose)  - Continue Mounjaro 10mg subcutaneous weekly   - Continue Lantus SoloStar Pen 15 Units subcutaneous at bedtime  - Instructed patient to check glucose level at home every day in the morning before eating or drinking anything and have readings available at next visit with clinical pharmacist

## 2025-04-04 ENCOUNTER — APPOINTMENT (OUTPATIENT)
Dept: PHARMACY | Facility: HOSPITAL | Age: 50
End: 2025-04-04
Payer: COMMERCIAL

## 2025-04-04 DIAGNOSIS — E11.9 TYPE 2 DIABETES MELLITUS WITHOUT COMPLICATION, WITHOUT LONG-TERM CURRENT USE OF INSULIN: ICD-10-CM

## 2025-04-04 DIAGNOSIS — E11.9 DIABETES MELLITUS WITHOUT COMPLICATION: ICD-10-CM

## 2025-04-04 RX ORDER — TIRZEPATIDE 10 MG/.5ML
10 INJECTION, SOLUTION SUBCUTANEOUS
Qty: 2 ML | Refills: 3 | Status: SHIPPED | OUTPATIENT
Start: 2025-04-04

## 2025-04-04 NOTE — ASSESSMENT & PLAN NOTE
Assessment:    Patient's DMT2 is currently uncontrolled based on most recent hemoglobin A1C level of  7.8% from 3/1/25 but controlled based on average of patient's home glucose readings for the past week. Patient currently on metformin XR 500mg PO daily (maximum tolerable dose of med), Mounjaro 10mg subcutaneous weekly, and Lantus SoloStar Pen 15 Units subcutaneous at bedtime and reports compliance with meds and denies any ADRs to meds.    Plan:  - Continue metformin XR 500mg PO daily (maximum tolerable dose)  - Continue Mounjaro 10mg subcutaneous weekly   - Continue Lantus SoloStar Pen 15 Units subcutaneous at bedtime  - Continue to check glucose level at home every day in the morning before eating or drinking anything and have readings available at next visit with clinical pharmacist

## 2025-04-04 NOTE — PROGRESS NOTES
Primary Care Clinical Pharmacist Follow-Up Visit    Date of Follow-Up Visit: 4/4/25  Date of Initial Visit: 10/7/24  Disease state(s) to be managed by clinical pharmacist: DMT2  Referring Provider: Dr. Lorie Crawford (PCP)  Most recent appointment with PCP: 9/20/24  Next appointment with PCP: not yet scheduled              Subjective:    Patient is a 50 YO M who presents virtually to visit with clinical pharmacist for follow-up visit for management of DMT2. Patient gives consent to have visit conducted via telehealth. Patient was referred to clinical pharmacist for management of disease state(s) by PCP. At last visit with clinical pharmacist, patient was instructed to continue metformin XR 500mg PO daily (maximum tolerable dose), Mounjaro 10mg subcutaneous weekly, and Lantus SoloStar Pen 15 Units subcutaneous at bedtime. At today's visit, patient reports taking metformin XR 500mg PO daily, Mounjaro 10mg subcutaneous weekly, and Lantus SoloStar Pen 15 Units subcutaneous at bedtime and reports compliance with meds and reports tolerating these meds at these doses.       Objective:    Most recent hemoglobin A1C level: 7.8% (3/1/25) (goal: less than 7%)    Patient checks glucose level at home. Below are patient's home glucose readings for the past week (all readings morning fasting):    112, 116, 98, 100, 99, 101  (Avg = 104 mg/dL, goal:  mg/dL)    Patient denies having obtained any home glucose readings less than 70 mg/dL        Most recent eGFR: 104 (3/1/25)  Most recent vitamin B12 level:  454 (3/1/25)  Most recent Albumin/SCr ratio: 4.8  (5/25/24)  Most recent AST/ALT: 21/32 (3/1/25)  Most recent diabetic retinopathy exam:  10/22/24 (no diabetic retinopathy in either eye)  Most recent influenza vaccine: 10/26/24  Most recent pneumococcal vaccine:  none on file       Below are the results of patient's  most recent fasting lipid panel (from 3/1/25):    LDL:  137 (goal: less than 100 )  TG : 95  (goal: less than 500)         HDL:  45  (goal: greater than 40)    TC: 202 (goal: less than 200)     **The results of the above lipid panel are reflective of patient not taking a cholesterol-lowering medication. Patient is currently not taking a cholesterol-lowering medication and states he does not want to**    Assessment:    Patient's DMT2 is currently uncontrolled based on most recent hemoglobin A1C level of  7.8% from 3/1/25 but controlled based on average of patient's home glucose readings for the past week. Patient currently on metformin XR 500mg PO daily (maximum tolerable dose of med), Mounjaro 10mg subcutaneous weekly, and Lantus SoloStar Pen 15 Units subcutaneous at bedtime and reports compliance with meds and denies any ADRs to meds.    Plan:  - Continue metformin XR 500mg PO daily (maximum tolerable dose)  - Continue Mounjaro 10mg subcutaneous weekly   - Continue Lantus SoloStar Pen 15 Units subcutaneous at bedtime  - Continue to check glucose level at home every day in the morning before eating or drinking anything and have readings available at next visit with clinical pharmacist      The patient verbalized understanding of everything discussed at today's visit and agrees with plan formulated by clinical pharmacist    Next visit with clinical pharmacist:  4/25/25 at 12:00 PM via telehealth      Continue all meds under the continuation of care with the referring provider and clinical pharmacy team.    Patient Education    The patient is aware of the following regarding DMT2:    - The side effects of the medications patient uses to treat disease state(s) and what to do if they occur    - How to correct hypoglycemia, if it ever occurs      TYLER Rice, PharmD, CPh, BCACP  Clinical Pharmacy Specialist, [facility]

## 2025-04-17 ENCOUNTER — APPOINTMENT (OUTPATIENT)
Dept: PODIATRY | Facility: CLINIC | Age: 50
End: 2025-04-17
Payer: COMMERCIAL

## 2025-04-18 ENCOUNTER — APPOINTMENT (OUTPATIENT)
Dept: ORTHOPEDIC SURGERY | Facility: CLINIC | Age: 50
End: 2025-04-18
Payer: COMMERCIAL

## 2025-04-22 ENCOUNTER — APPOINTMENT (OUTPATIENT)
Dept: ENDOCRINOLOGY | Facility: CLINIC | Age: 50
End: 2025-04-22
Payer: COMMERCIAL

## 2025-04-22 VITALS
HEART RATE: 89 BPM | HEIGHT: 67 IN | WEIGHT: 198 LBS | DIASTOLIC BLOOD PRESSURE: 82 MMHG | BODY MASS INDEX: 31.08 KG/M2 | SYSTOLIC BLOOD PRESSURE: 123 MMHG

## 2025-04-22 DIAGNOSIS — E89.0 POSTABLATIVE HYPOTHYROIDISM: Primary | ICD-10-CM

## 2025-04-22 DIAGNOSIS — C73 THYROID CANCER (MULTI): Primary | ICD-10-CM

## 2025-04-22 PROCEDURE — 3079F DIAST BP 80-89 MM HG: CPT | Performed by: INTERNAL MEDICINE

## 2025-04-22 PROCEDURE — 99214 OFFICE O/P EST MOD 30 MIN: CPT | Performed by: INTERNAL MEDICINE

## 2025-04-22 PROCEDURE — 4010F ACE/ARB THERAPY RXD/TAKEN: CPT | Performed by: INTERNAL MEDICINE

## 2025-04-22 PROCEDURE — 3074F SYST BP LT 130 MM HG: CPT | Performed by: INTERNAL MEDICINE

## 2025-04-22 PROCEDURE — 3008F BODY MASS INDEX DOCD: CPT | Performed by: INTERNAL MEDICINE

## 2025-04-22 NOTE — PROGRESS NOTES
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      Background History:  Endocrine initially involved due to abnormal findings on physical exam showing some neck enlargement prompting an thyroid ultrasound that showed, thyroid enlargement, with multiple nodules, dominant nodule on the right thyroid lobe measuring 4.6 x 2.7 x 5 cm. and other multiple nodules on the left thyroid lobe. On 8/13.20 FNA thyroid biopsy and pathology showed benign follicular nodule. He underwent total thyroidectomy in Nov 2020 and follicular nodular disease with small incidental 0.05 cm PTC in left lobe on pathology.     Interval History:  He was last seen by Endocrine on 4/24.   Ozempic switched to Moujaro 3 months ptp.  Current Weight: 195 pounds - prior to Ozempic was 208 pounds, weight loss over the course of 3 months.   Today, he reports acceptable energy levels. Sleep is Okay. Pharmacist following.   LT4 of 150 mcg orally daily - taken adequately. He otherwise felt well denying symptoms of hyper or hypothyroidism including; palpitations, chest pain, excessive sweating, weight changes - at  the time- eye changes, or skin changes. No dysphasia, compressive sx. shortness of breath, hoarseness.  His Diabetes is essentially managed by Pharmacist at this time - until he sees Ms. SARITHA Hood.   Diet: Recently started Intermittent Fasting - Fasting Interval 7pm to 9 AM. + Portion Control. Calorie Counts- 2500 calories/day. Does not Carb Count. + Salads. + proteins.   Beverages: No EtOH, No Sodas, No Juices.   Snacks: Cashews - Nuts - no sweets.  Exercise: 5,000 steps/day - monitors.     10 pt ROS reviewed and is otherwise negative.   Vitals:    04/22/25 1538   BP: 123/82   Pulse: 89    Constitutional: NAD, well groomed. CCO3.   Skin/Hair: Acanthosis Nigricans over  posterior neck   HEENT: EOMI, Anicteric scleras, No lid lag or lid retraction, No TTP of ocular globes. Dry oral mucosa. [place]ek sign positive b/l   Neck: Soft, supple. Non tender, full ROM, no lymphadenopathy. Thyroid gland not palpated. Surgical , scar present.  Cardiovascular: RRR, no rub or murmurs.  Respiratory: no accessory muscle use.  Abdomen: +BS, + Subcutaneous Fat, Soft, Nt, No HSM   Extremities: Preserved peripheral pulses, No peripheral edema.  Neuro: Moving all extremities spontaneously. CN's grossly intact. No balance or gait disturbances. DTRs: no delay in relaxation phase.    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      Background History:  Endocrine initially involved due to abnormal findings on physical exam showing some neck enlargement prompting an thyroid ultrasound that showed, thyroid enlargement, with multiple nodules, dominant nodule on the right thyroid lobe measuring 4.6 x 2.7 x 5 cm. and other multiple nodules on the left thyroid lobe. On 8/13/20 FNA thyroid biopsy and pathology showed benign follicular nodule. He underwent total thyroidectomy in Nov 2020 and follicular nodular disease with small incidental 0.05 cm PTC in left lobe on pathology.     Clinically and biochemically euthyroid.   LT 4 dose stable at 150 mcg - taken adequately. Planned to continue for now.   RTC annually - scheduled next with Dr. Cruz in April of 2026. Recommended he takes Vitamin D of 5000 units daily +[place] on exam. LT4 refills provided. TSH- Free T4- CMP- PTH ordered for June of 2025.   Of note - DM2 on the right trajectory - following with Pharm - scheduled with Ms. SARITHA Hood.    Patient is seen, discussed, and examined with  who agrees with the management plan.
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
## 2025-04-25 ENCOUNTER — APPOINTMENT (OUTPATIENT)
Dept: PHARMACY | Facility: HOSPITAL | Age: 50
End: 2025-04-25
Payer: COMMERCIAL

## 2025-04-25 ENCOUNTER — OFFICE VISIT (OUTPATIENT)
Dept: ORTHOPEDIC SURGERY | Facility: CLINIC | Age: 50
End: 2025-04-25
Payer: COMMERCIAL

## 2025-04-25 VITALS — WEIGHT: 198 LBS | HEIGHT: 67 IN | BODY MASS INDEX: 31.08 KG/M2

## 2025-04-25 DIAGNOSIS — E11.9 DIABETES MELLITUS WITHOUT COMPLICATION: ICD-10-CM

## 2025-04-25 DIAGNOSIS — M17.11 PRIMARY OSTEOARTHRITIS OF RIGHT KNEE: ICD-10-CM

## 2025-04-25 DIAGNOSIS — S83.91XA SPRAIN OF RIGHT KNEE/LEG, INITIAL ENCOUNTER: ICD-10-CM

## 2025-04-25 DIAGNOSIS — M25.561 RIGHT KNEE PAIN, UNSPECIFIED CHRONICITY: Primary | ICD-10-CM

## 2025-04-25 DIAGNOSIS — E11.9 TYPE 2 DIABETES MELLITUS WITHOUT COMPLICATION, WITHOUT LONG-TERM CURRENT USE OF INSULIN: ICD-10-CM

## 2025-04-25 PROCEDURE — 99213 OFFICE O/P EST LOW 20 MIN: CPT | Mod: 25 | Performed by: PHYSICIAN ASSISTANT

## 2025-04-25 PROCEDURE — 99213 OFFICE O/P EST LOW 20 MIN: CPT | Performed by: PHYSICIAN ASSISTANT

## 2025-04-25 PROCEDURE — 3008F BODY MASS INDEX DOCD: CPT | Performed by: PHYSICIAN ASSISTANT

## 2025-04-25 PROCEDURE — 2500000004 HC RX 250 GENERAL PHARMACY W/ HCPCS (ALT 636 FOR OP/ED): Performed by: PHYSICIAN ASSISTANT

## 2025-04-25 PROCEDURE — 4010F ACE/ARB THERAPY RXD/TAKEN: CPT | Performed by: PHYSICIAN ASSISTANT

## 2025-04-25 PROCEDURE — 1036F TOBACCO NON-USER: CPT | Performed by: PHYSICIAN ASSISTANT

## 2025-04-25 PROCEDURE — 20610 DRAIN/INJ JOINT/BURSA W/O US: CPT | Mod: RT | Performed by: PHYSICIAN ASSISTANT

## 2025-04-25 RX ORDER — TRIAMCINOLONE ACETONIDE 40 MG/ML
40 INJECTION, SUSPENSION INTRA-ARTICULAR; INTRAMUSCULAR
Status: COMPLETED | OUTPATIENT
Start: 2025-04-25 | End: 2025-04-25

## 2025-04-25 RX ORDER — LIDOCAINE HYDROCHLORIDE 10 MG/ML
1 INJECTION, SOLUTION INFILTRATION; PERINEURAL
Status: COMPLETED | OUTPATIENT
Start: 2025-04-25 | End: 2025-04-25

## 2025-04-25 RX ADMIN — LIDOCAINE HYDROCHLORIDE 1 ML: 10 INJECTION, SOLUTION INFILTRATION; PERINEURAL at 15:22

## 2025-04-25 RX ADMIN — TRIAMCINOLONE ACETONIDE 40 MG: 40 INJECTION, SUSPENSION INTRA-ARTICULAR; INTRAMUSCULAR at 15:22

## 2025-04-25 ASSESSMENT — LIFESTYLE VARIABLES
HOW OFTEN DURING THE LAST YEAR HAVE YOU NEEDED AN ALCOHOLIC DRINK FIRST THING IN THE MORNING TO GET YOURSELF GOING AFTER A NIGHT OF HEAVY DRINKING: NEVER
HOW OFTEN DURING THE LAST YEAR HAVE YOU FAILED TO DO WHAT WAS NORMALLY EXPECTED FROM YOU BECAUSE OF DRINKING: NEVER
HOW OFTEN DURING THE LAST YEAR HAVE YOU BEEN UNABLE TO REMEMBER WHAT HAPPENED THE NIGHT BEFORE BECAUSE YOU HAD BEEN DRINKING: NEVER
HOW OFTEN DURING THE LAST YEAR HAVE YOU HAD A FEELING OF GUILT OR REMORSE AFTER DRINKING: NEVER
HOW MANY STANDARD DRINKS CONTAINING ALCOHOL DO YOU HAVE ON A TYPICAL DAY: PATIENT DOES NOT DRINK
HAVE YOU OR SOMEONE ELSE BEEN INJURED AS A RESULT OF YOUR DRINKING: NO
HOW OFTEN DURING THE LAST YEAR HAVE YOU FOUND THAT YOU WERE NOT ABLE TO STOP DRINKING ONCE YOU HAD STARTED: NEVER
SKIP TO QUESTIONS 9-10: 1
AUDIT TOTAL SCORE: 0
AUDIT-C TOTAL SCORE: 0
HOW OFTEN DO YOU HAVE A DRINK CONTAINING ALCOHOL: NEVER
HAS A RELATIVE, FRIEND, DOCTOR, OR ANOTHER HEALTH PROFESSIONAL EXPRESSED CONCERN ABOUT YOUR DRINKING OR SUGGESTED YOU CUT DOWN: NO
HOW OFTEN DO YOU HAVE SIX OR MORE DRINKS ON ONE OCCASION: NEVER

## 2025-04-25 ASSESSMENT — ENCOUNTER SYMPTOMS
LOSS OF SENSATION IN FEET: 0
DEPRESSION: 0
OCCASIONAL FEELINGS OF UNSTEADINESS: 0

## 2025-04-25 ASSESSMENT — PAIN - FUNCTIONAL ASSESSMENT: PAIN_FUNCTIONAL_ASSESSMENT: NO/DENIES PAIN

## 2025-04-25 ASSESSMENT — PAIN SCALES - GENERAL: PAINLEVEL_OUTOF10: 0-NO PAIN

## 2025-04-25 NOTE — PROGRESS NOTES
Primary Care Clinical Pharmacist Follow-Up Visit    Date of Follow-Up Visit: 4/25/25  Date of Initial Visit: 10/7/24  Disease state(s) to be managed by clinical pharmacist: DMT2  Referring Provider: Dr. Lorie Crawford (PCP)  Most recent appointment with PCP: 9/20/24  Next appointment with PCP: not yet scheduled              Subjective:    Patient is a 48 YO M who presents virtually to visit with clinical pharmacist for follow-up visit for management of DMT2. Patient gives consent to have visit conducted via telehealth. Patient was referred to clinical pharmacist for management of disease state(s) by PCP. At last visit with clinical pharmacist, patient was instructed to continue metformin XR 500mg PO daily (maximum tolerable dose), Mounjaro 10mg subcutaneous weekly, and Lantus SoloStar Pen 15 Units subcutaneous at bedtime. At today's visit, patient reports taking metformin XR 500mg PO daily, Mounjaro 10mg subcutaneous weekly, and Lantus SoloStar Pen 15 Units subcutaneous at bedtime and reports compliance with meds and reports tolerating these meds at these doses.       Objective:    Most recent hemoglobin A1C level: 7.8% (3/1/25) (goal: less than 7%)    Patient checks glucose level at home. Below are patient's home glucose readings since last visit (all readings morning fasting):    88, 94, 97, 102, 115, 94, 100, 99, 98, 93, 89, 96, 92, 103, 91, 90, 98, 96, 97  (Avg = 96 mg/dL, goal:  mg/dL)    Patient denies having obtained any home glucose readings less than 70 mg/dL        Most recent eGFR: 104 (3/1/25)  Most recent vitamin B12 level:  454 (3/1/25)  Most recent Albumin/SCr ratio: 4.8  (5/25/24)  Most recent AST/ALT: 21/32 (3/1/25)  Most recent diabetic retinopathy exam:  10/22/24 (no diabetic retinopathy in either eye)  Most recent influenza vaccine: 10/26/24  Most recent pneumococcal vaccine:   none on file       Below are the results of patient's most recent fasting lipid panel (from 3/1/25):    LDL:  137 (goal: less than 100 )  TG : 95  (goal: less than 500)         HDL:  45  (goal: greater than 40)    TC: 202 (goal: less than 200)     **The results of the above lipid panel are reflective of patient not taking a cholesterol-lowering medication. Patient is currently not taking a cholesterol-l owering medication and states he does not want to**    Assessment:    Patient's DMT2 is currently uncontrolled based on most recent hemoglobin A1C level of  7.8% from 3/1/25 but controlled based on average of patient's home glucose readings since last visit. Patient currently on metformin XR 500mg PO daily (maximum tolerable dose of med), Mounjaro 10mg subcutaneous weekly, and Lantus SoloStar Pen 15 Units subcutaneous at bedtime and reports compliance with meds and denies any ADRs to meds.    Plan:  - Continue metformin XR 500mg PO daily (maximum tolerable dose)  - Continue Mounjaro 10mg subcutaneous weekly   - Continue Lantus SoloStar Pen 15 Units subcutaneous at bedtime  - Continue to check glucose level at home every day in the morning before eating or drinking anything and have readings available at next visit with clinical pharmacist      The patient verbalized understanding of everything discussed at today's visit and agrees with plan formulated by clinical pharmacist    Next visit with clinical pharmacist:  5/23/25 at 12:00 PM via telehealth      Continue all meds under the continuation of care with the referring provider and clinical pharmacy team.    Patient Education    The patient is aware of the following regarding DMT2:    - The side effects of the medications patient uses to treat disease state(s) and what to do if they occur    - How to correct hypoglycemia, if it ever occurs      TYLER Rice, PharmD, CPh, BCACP  Clinical Pharmacy Specialist, [facility]

## 2025-04-25 NOTE — ASSESSMENT & PLAN NOTE
Assessment:    Patient's DMT2 is currently uncontrolled based on most recent hemoglobin A1C level of  7.8% from 3/1/25 but controlled based on average of patient's home glucose readings since last visit. Patient currently on metformin XR 500mg PO daily (maximum tolerable dose of med), Mounjaro 10mg subcutaneous weekly, and Lantus SoloStar Pen 15 Units subcutaneous at bedtime and reports compliance with meds and denies any ADRs to meds.    Plan:  - Continue metformin XR 500mg PO daily (maximum tolerable dose)  - Continue Mounjaro 10mg subcutaneous weekly   - Continue Lantus SoloStar Pen 15 Units subcutaneous at bedtime  - Continue to check glucose level at home every day in the morning before eating or drinking anything and have readings available at next visit with clinical pharmacist

## 2025-04-29 ENCOUNTER — APPOINTMENT (OUTPATIENT)
Dept: PODIATRY | Facility: CLINIC | Age: 50
End: 2025-04-29
Payer: COMMERCIAL

## 2025-04-29 DIAGNOSIS — Z79.4 TYPE 2 DIABETES MELLITUS WITHOUT COMPLICATION, WITH LONG-TERM CURRENT USE OF INSULIN: Primary | ICD-10-CM

## 2025-04-29 DIAGNOSIS — E11.9 TYPE 2 DIABETES MELLITUS WITHOUT COMPLICATION, WITH LONG-TERM CURRENT USE OF INSULIN: Primary | ICD-10-CM

## 2025-04-29 DIAGNOSIS — E11.9 ENCOUNTER FOR DIABETIC FOOT EXAM (MULTI): ICD-10-CM

## 2025-04-29 DIAGNOSIS — B35.3 TINEA PEDIS OF BOTH FEET: ICD-10-CM

## 2025-04-29 PROCEDURE — 99213 OFFICE O/P EST LOW 20 MIN: CPT | Performed by: PODIATRIST

## 2025-04-29 PROCEDURE — 4010F ACE/ARB THERAPY RXD/TAKEN: CPT | Performed by: PODIATRIST

## 2025-04-29 PROCEDURE — 1036F TOBACCO NON-USER: CPT | Performed by: PODIATRIST

## 2025-04-29 RX ORDER — ECONAZOLE NITRATE 10 MG/G
CREAM TOPICAL
Qty: 30 G | Refills: 11 | Status: SHIPPED | OUTPATIENT
Start: 2025-04-29 | End: 2026-04-29

## 2025-04-29 NOTE — PROGRESS NOTES
History Of Present Illness  Juan Mackey is a 49 y.o. male: diabatic foot care. Patient complains with painful elongated nails     PCP Juan Mittalon DO  Last visit 2024    Past Medical History  He has a past medical history of Allergic, Arthritis, and Diabetes mellitus (Multi).    Surgical History  He has a past surgical history that includes Other surgical history (08/28/2020); Other surgical history (07/19/2022); and Appendectomy.     Social History  He reports that he has never smoked. He has never been exposed to tobacco smoke. He has never used smokeless tobacco. He reports that he does not currently use alcohol. He reports that he does not currently use drugs after having used the following drugs: Marijuana.    Family History  No family history on file.     Allergies  Shrimp, Oxycodone, and Shellfish containing products    Medications  Current Outpatient Medications   Medication Sig Dispense Refill    albuterol 90 mcg/actuation inhaler 2 puffs 4 times a day as needed.      bacitracin 500 unit/gram ointment Apply inside the nose 3 times a day for 3 weeks 28 g 1    insulin glargine (Lantus Solostar U-100 Insulin) 100 unit/mL (3 mL) pen Inject 15 Units under the skin once daily at bedtime. Take as directed per insulin instructions. 3 mL 12    levothyroxine (Synthroid, Levoxyl) 150 mcg tablet Take 1 tablet (150 mcg) by mouth once daily in the morning. Take before meals. 90 tablet 3    lisinopril 2.5 mg tablet Take 1 tablet (2.5 mg) by mouth once daily. 100 tablet 3    metFORMIN XR (Glucophage-XR) 500 mg 24 hr tablet Take 2 tablets (1,000 mg) by mouth 2 times daily (morning and late afternoon). Do not crush, chew, or split. 120 tablet 11    tirzepatide (Mounjaro) 10 mg/0.5 mL pen injector Inject 10 mg under the skin every 7 days. 2 mL 3    empagliflozin (Jardiance) 10 mg Take 1 tablet (10 mg) by mouth once daily. (Patient not taking: Reported on 4/25/2025) 30 tablet 2    sildenafil (Viagra) 50 mg tablet Take 1  tablet (50 mg) by mouth once daily as needed for erectile dysfunction. 50 mg once daily as needed 1 hour before sexual activity. May increase to a maximum dose of 100 mg once daily if there is incomplete response. 60 tablet 3     No current facility-administered medications for this visit.       Review of Systems    REVIEW OF SYSTEMS  GENERAL:  Negative for malaise, significant weight loss, fever  CARDIOVASCULAR: leg swelling   MUSCULOSKELETAL:  Negative for joint pain or swelling, back pain, and muscle pain.  SKIN:  Negative for lesions, rash, and itching  PSYCH:  Negative for sleep disturbance, mood disorder and recent psychosocial stressors  NEURO: Negative, denies any burning, tingling or numbness     Objective:   Vasc: DP and PT pulses are palpable bilateral.  CFT is less than 3 seconds bilateral.  Skin temperature is warm to cool proximal to distal bilateral.  No pedal edema is appreciated.    Neuro:  Light touch is intact to the foot bilateral.  Protective sensation is intact to the foot when tested with the 5.07 SWM bilateral.  There is no clonus noted.  The hallux is downgoing bilateral.      Derm: Nails 1-5 bilateral are thickened, elongated and crumbly with subungual debris. Skin is supple with normal texture and turgor noted.  Patient has diffuse tinea present there are no hyperkeratoses, ulcerations, verruca or other lesions noted.      Ortho: Muscle strength is 5/5 for all pedal groups tested.  Ankle joint, subtalar joint, 1st MPJ and lesser MPJ ROM is full and without pain or crepitus.  The foot type is rectus bilateral off weight bearing.  There are no structural deformities noted.    Assessment/Plan     There are no diagnoses linked to this encounter.      We discussed the need for decreasing blood sugar.  Patient is very committed to leading a healthier lifestyle.  We will start patient on a topical antifungal for his tinea.  Patient will be started on Ciclodan for his toenail fungus.            Deedee Zamora MA

## 2025-04-29 NOTE — PROGRESS NOTES
History Of Present Illness  Juan Mackey is a 49 y.o. male presenting for annual diabetic foot check.    PCP none currently     Past Medical History  He has a past medical history of Allergic, Arthritis, and Diabetes mellitus (Multi).    Surgical History  He has a past surgical history that includes Other surgical history (08/28/2020); Other surgical history (07/19/2022); and Appendectomy.     Social History  He reports that he has never smoked. He has never been exposed to tobacco smoke. He has never used smokeless tobacco. He reports that he does not currently use alcohol. He reports that he does not currently use drugs after having used the following drugs: Marijuana.    Family History  Family History[1]     Allergies  Shrimp, Oxycodone, and Shellfish containing products    Medications  Current Medications[2]    Review of Systems    REVIEW OF SYSTEMS  GENERAL:  Negative for malaise, significant weight loss, fever  CARDIOVASCULAR: leg swelling   MUSCULOSKELETAL:  Negative for joint pain or swelling, back pain, and muscle pain.  SKIN:  Negative for lesions, rash, and itching  PSYCH:  Negative for sleep disturbance, mood disorder and recent psychosocial stressors  NEURO: Negative, denies any burning, tingling or numbness     Objective:   Vasc: DP and PT pulses are palpable bilateral.  CFT is less than 3 seconds bilateral.  Skin temperature is warm to cool proximal to distal bilateral.      Neuro:  Light touch is intact to the foot bilateral.  Protective sensation is intact to the foot when tested with the 5.07 SWM bilateral.  There is no clonus noted.  The hallux is downgoing bilateral.      Derm: Nails 1-5 bilateral are clear. Skin is not supple with normal texture and turgor noted.  Webspaces have tinea b/l  bilateral.  There are no hyperkeratoses, ulcerations, verruca or other lesions noted.  Significant  fungal infection plantar feet    Ortho: Muscle strength is 5/5 for all pedal groups tested.  Ankle joint,  subtalar joint, 1st MPJ and lesser MPJ ROM is full and without pain or crepitus.  The foot type is rectus bilateral off weight bearing.  There are no structural deformities noted.    Assessment/Plan     Diagnoses and all orders for this visit:  Type 2 diabetes mellitus without complication, with long-term current use of insulin  Tinea pedis of both feet  Encounter for diabetic foot exam (Multi)      At this time, Patient does not have a higher risk for foot ulceration.  No pressure points identified.  He does need to use an antifungal, prescription called in                 [1] No family history on file.  [2]   Current Outpatient Medications   Medication Sig Dispense Refill    albuterol 90 mcg/actuation inhaler 2 puffs 4 times a day as needed.      bacitracin 500 unit/gram ointment Apply inside the nose 3 times a day for 3 weeks 28 g 1    insulin glargine (Lantus Solostar U-100 Insulin) 100 unit/mL (3 mL) pen Inject 15 Units under the skin once daily at bedtime. Take as directed per insulin instructions. 3 mL 12    levothyroxine (Synthroid, Levoxyl) 150 mcg tablet Take 1 tablet (150 mcg) by mouth once daily in the morning. Take before meals. 90 tablet 3    metFORMIN XR (Glucophage-XR) 500 mg 24 hr tablet Take 2 tablets (1,000 mg) by mouth 2 times daily (morning and late afternoon). Do not crush, chew, or split. (Patient taking differently: Take 2 tablets (1,000 mg) by mouth once daily in the evening. Take with meals. Do not crush, chew, or split.) 120 tablet 11    tirzepatide (Mounjaro) 10 mg/0.5 mL pen injector Inject 10 mg under the skin every 7 days. 2 mL 3    empagliflozin (Jardiance) 10 mg Take 1 tablet (10 mg) by mouth once daily. (Patient not taking: Reported on 4/25/2025) 30 tablet 2    lisinopril 2.5 mg tablet Take 1 tablet (2.5 mg) by mouth once daily. (Patient not taking: Reported on 4/29/2025) 100 tablet 3    sildenafil (Viagra) 50 mg tablet Take 1 tablet (50 mg) by mouth once daily as needed for  erectile dysfunction. 50 mg once daily as needed 1 hour before sexual activity. May increase to a maximum dose of 100 mg once daily if there is incomplete response. 60 tablet 3     No current facility-administered medications for this visit.

## 2025-05-23 ENCOUNTER — APPOINTMENT (OUTPATIENT)
Dept: PHARMACY | Facility: HOSPITAL | Age: 50
End: 2025-05-23
Payer: COMMERCIAL

## 2025-05-23 DIAGNOSIS — E11.9 TYPE 2 DIABETES MELLITUS WITHOUT COMPLICATION, WITHOUT LONG-TERM CURRENT USE OF INSULIN: ICD-10-CM

## 2025-05-23 NOTE — PROGRESS NOTES
Primary Care Clinical Pharmacist Follow-Up Visit    Date of Follow-Up Visit: 5/23/25  Date of Initial Visit: 10/7/24  Disease state(s) to be managed by clinical pharmacist: DMT2  Referring Provider: Dr. Lorie Crawford (PCP)  Most recent appointment with PCP: 9/20/24  Next appointment with PCP: 5/27/25              Subjective:    Patient is a 50 YO M who presents virtually to visit with clinical pharmacist for follow-up visit for management of DMT2. Patient gives consent to have visit conducted via telehealth. Patient was referred to clinical pharmacist for management of disease state(s) by PCP. At last visit with clinical pharmacist, patient was instructed to continue metformin XR 500mg PO daily (maximum tolerable dose), Mounjaro 10mg subcutaneous weekly, and Lantus SoloStar Pen 15 Units subcutaneous at bedtime. At today's visit, patient reports taking metformin XR 500mg PO daily, Mounjaro 10mg subcutaneous weekly, and Lantus SoloStar Pen 15 Units subcutaneous at bedtime and reports compliance with meds and reports tolerating these meds at these doses.       Objective:    Most recent hemoglobin A1C level: 7.8% (3/1/25) (goal: less than 7%)    Patient checks glucose level at home. Below are patient's home glucose readings for the past two weeks (all readings morning fasting):    91, 94, 89, 90, 96, 82, 86, 80, 82, 80, 83, 87, 100, 102, 98, 91, 85, 90,89, 92, 91, 94  (Avg = 90 mg/dL, goal:  mg/dL)    Patient denies having obtained any home glucose readings less than 70 mg/dL        Most recent eGFR: 104 (3/1/25)  Most recent vitamin B12 level:  454 (3/1/25)  Most recent Albumin/SCr ratio: 4.8  (5/25/24)  Most recent AST/ALT: 21/32 (3/1/25)  Most recent diabetic retinopathy exam:  10/22/24 (no diabetic retinopathy in either eye)  Most recent influenza vaccine: 10/26/24  Most recent pneumococcal vaccine:   none on file       Below are the results of patient's most recent fasting lipid panel (from 3/1/25):    LDL:  137 (goal: less than 100 )  TG : 95  (goal: less than 500)         HDL:  45  (goal: greater than 40)    TC: 202 (goal: less than 200)     **The results of the above lipid panel are reflective of patient not taking a cholesterol-lowering medication. Patient is currently not taking a cholesterol-l owering medication and states he does not want to**    Assessment:    Patient's DMT2 is currently uncontrolled based on most recent hemoglobin A1C level of  7.8% from 3/1/25 but controlled based on average of patient's home glucose readings since last visit. Patient currently on metformin XR 500mg PO daily (maximum tolerable dose of med), Mounjaro 10mg subcutaneous weekly, and Lantus SoloStar Pen 15 Units subcutaneous at bedtime and reports compliance with meds and denies any ADRs to meds.    Plan:  - Continue metformin XR 500mg PO daily (maximum tolerable dose)  - Continue Mounjaro 10mg subcutaneous weekly   - Continue Lantus SoloStar Pen 15 Units subcutaneous at bedtime  - Continue to check glucose level at home every day in the morning before eating or drinking anything and have readings available at next visit with clinical pharmacist  - Instructed patient to go to lab on 5/31/25 to have hemoglobin A1C level rechecked      The patient verbalized understanding of everything discussed at today's visit and agrees with plan formulated by clinical pharmacist    Next visit with clinical pharmacist:  6/27/25 at 12:00 PM via telehealth      Continue all meds under the continuation of care with the referring provider and clinical pharmacy team.    Patient Education    The patient is aware of the following regarding DMT2:    - The side effects of the medications patient uses to treat disease state(s) and what to do if they occur    - How to correct hypoglycemia, if it ever occurs      TYLER Rice, PharmD,   BCACP  Clinical Pharmacy Specialist, [facility]

## 2025-05-27 ENCOUNTER — APPOINTMENT (OUTPATIENT)
Dept: ENDOCRINOLOGY | Facility: CLINIC | Age: 50
End: 2025-05-27
Payer: COMMERCIAL

## 2025-05-27 VITALS
WEIGHT: 195 LBS | BODY MASS INDEX: 30.61 KG/M2 | DIASTOLIC BLOOD PRESSURE: 86 MMHG | HEART RATE: 98 BPM | TEMPERATURE: 97.9 F | HEIGHT: 67 IN | SYSTOLIC BLOOD PRESSURE: 127 MMHG

## 2025-05-27 DIAGNOSIS — E78.5 HYPERLIPIDEMIA, UNSPECIFIED HYPERLIPIDEMIA TYPE: ICD-10-CM

## 2025-05-27 DIAGNOSIS — E11.65 TYPE 2 DIABETES MELLITUS WITH HYPERGLYCEMIA, WITH LONG-TERM CURRENT USE OF INSULIN: Primary | ICD-10-CM

## 2025-05-27 DIAGNOSIS — Z79.4 TYPE 2 DIABETES MELLITUS WITH HYPERGLYCEMIA, WITH LONG-TERM CURRENT USE OF INSULIN: Primary | ICD-10-CM

## 2025-05-27 PROCEDURE — 3079F DIAST BP 80-89 MM HG: CPT | Performed by: NURSE PRACTITIONER

## 2025-05-27 PROCEDURE — 3074F SYST BP LT 130 MM HG: CPT | Performed by: NURSE PRACTITIONER

## 2025-05-27 PROCEDURE — 1036F TOBACCO NON-USER: CPT | Performed by: NURSE PRACTITIONER

## 2025-05-27 PROCEDURE — 99215 OFFICE O/P EST HI 40 MIN: CPT | Performed by: NURSE PRACTITIONER

## 2025-05-27 PROCEDURE — 3008F BODY MASS INDEX DOCD: CPT | Performed by: NURSE PRACTITIONER

## 2025-05-27 ASSESSMENT — PATIENT HEALTH QUESTIONNAIRE - PHQ9
SUM OF ALL RESPONSES TO PHQ9 QUESTIONS 1 AND 2: 0
1. LITTLE INTEREST OR PLEASURE IN DOING THINGS: NOT AT ALL
2. FEELING DOWN, DEPRESSED OR HOPELESS: NOT AT ALL

## 2025-05-27 ASSESSMENT — ENCOUNTER SYMPTOMS
LOSS OF SENSATION IN FEET: 0
DEPRESSION: 0
OCCASIONAL FEELINGS OF UNSTEADINESS: 0

## 2025-05-27 ASSESSMENT — PAIN SCALES - GENERAL: PAINLEVEL_OUTOF10: 0-NO PAIN

## 2025-05-27 NOTE — Clinical Note
Thanks for your work with this patient thus far.  He is improving.  I put CGM on him today.  Did you plan to see him anytime soon?

## 2025-05-27 NOTE — LETTER
May 27, 2025     Patient: Juan Mackey   YOB: 1975   Date of Visit: 5/27/2025       To Whom It May Concern:    Juan Mackey was seen in my clinic on 5/27/2025 at 11:15 am. Please excuse Juan for his absence from work on this day to make the appointment.    If you have any questions or concerns, please don't hesitate to call.         Sincerely,       ANGEL Cardenas  Signed Electronically to expedite

## 2025-05-27 NOTE — ASSESSMENT & PLAN NOTE
Assessment:    Patient's DMT2 is currently uncontrolled based on most recent hemoglobin A1C level of  7.8% from 3/1/25 but controlled based on average of patient's home glucose readings since last visit. Patient currently on metformin XR 500mg PO daily (maximum tolerable dose of med), Mounjaro 10mg subcutaneous weekly, and Lantus SoloStar Pen 15 Units subcutaneous at bedtime and reports compliance with meds and denies any ADRs to meds.    Plan:  - Continue metformin XR 500mg PO daily (maximum tolerable dose)  - Continue Mounjaro 10mg subcutaneous weekly   - Continue Lantus SoloStar Pen 15 Units subcutaneous at bedtime  - Continue to check glucose level at home every day in the morning before eating or drinking anything and have readings available at next visit with clinical pharmacist  - Instructed patient to go to lab on 5/31/25 to have hemoglobin A1C level rechecked

## 2025-05-27 NOTE — PATIENT INSTRUCTIONS
For now, lower the Lantus 10 units once daily     Continue Mounjaro 10 mg once weekly     Continue metformin 500 mg once daily with dinner      Get fasting labs and urine     Wear Kayce 3   I will look at readings in two weeks     Follow up in 4-6 months.

## 2025-05-27 NOTE — PROGRESS NOTES
Subjective   My final recommendations will be communicated back to the requesting provider by way of shared medical record.     Juan Mackey is a 49 y.o. male here today for a new patient visit regarding Type 2 diabetes. Initial diagnosis with diabetes was about 3-4 years ago.  Had a yearly physical at work.  Found elevated blood glucose.   The patient has a family history of prediabetes in mother.    Known complications include: HTN, Hyperlipidemia    Previously seeing Dr. Arreguin for thyroid cancer  This is the first visit with me for diabetes care.    A1c 7.8% in 3/2025.  Previous A1c 12.4% in 9/2024    Here today to establish care for diabetes   Has been meeting with [facility] through PCP   He started Jardiance a while back and urinated all the time   He could not urinate all day while he was working- this was stopped  He is doing well with Mounajro   Changed from Ozempic   Tolerating overall well   Not able to tolerate higher dose of Metformin     Historical meds:  Jardiance- excessive urination   Ozempic 2 mg- switched to Mounjaro     Current diabetes regimen is as follows:   Mounjaro 10 mg once weekly   Metformin  mg daily at dinner  (max tolerated dose)  Lantus 15 units once daily at bedtime      The patient is currently checking the blood glucose 1 time per day .  Most readings     Hypoglycemia frequency: Denies anything less than 80  Hypoglycemia awareness: yes     The patient comes into the office today with elevated A1c.       ROS   General: no fever, chills.  Weight loss of 15 lbs.    Skin: no rashes, pruritis or dry skin  Eyes: no blurred or double vision or eye pain  Cardiac: denies chest pain, heart palpitations or orthopnea  Pulmonary: denies wheezing, productive cough or exertional dyspnea  GI: denies nausea, vomiting, diarrhea or constipation  Neuro: denies numbness/tingling in hands or feet, denies seizures  Musc: denies history of upper or lower extremity weakness  Endocrine: See  "HPI  Hematology: Negative for anemia, easy bleeding and bruising.    Objective    Physical Exam  Blood pressure 127/86, pulse 98, temperature 36.6 °C (97.9 °F), temperature source Oral, height 1.702 m (5' 7\"), weight 88.5 kg (195 lb).  General: not in acute distress, cooperative   Respiratory: normal respiratory effort  Musculoskeletal: normal gait     Current Medications[1]    Lab Results   Component Value Date    BILITOT 0.4 03/01/2025    CALCIUM 9.2 03/01/2025    CO2 27 03/01/2025     03/01/2025    CREATININE 0.89 03/01/2025    GLUCOSE 116 (H) 03/01/2025    ALKPHOS 72 03/01/2025    K 4.2 03/01/2025    PROT 7.0 03/01/2025     03/01/2025    AST 21 03/01/2025    ALT 32 03/01/2025    BUN 12 03/01/2025    ANIONGAP 6 (L) 03/01/2025    MG 1.87 11/06/2020    PHOS 3.3 09/20/2024    ALBUMIN 4.1 03/01/2025    GFRMALE 85 01/07/2023     Lab Results   Component Value Date    TRIG 95 03/01/2025    CHOL 202 (H) 03/01/2025    LDLCALC 137 (H) 03/01/2025    HDL 45 03/01/2025     Lab Results   Component Value Date    HGBA1C 7.8 (H) 03/01/2025    HGBA1C 12.4 (H) 09/20/2024    HGBA1C 11.4 (H) 05/25/2024       The 10-year ASCVD risk score (Laura SCOTT, et al., 2019) is: 10%    Values used to calculate the score:      Age: 49 years      Sex: Male      Is Non- : Yes      Diabetic: Yes      Tobacco smoker: No      Systolic Blood Pressure: 123 mmHg      Is BP treated: No      HDL Cholesterol: 45 mg/dL      Total Cholesterol: 202 mg/dL    Assessment & Plan  Type 2 diabetes mellitus with hyperglycemia, with long-term current use of insulin    Orders:    Follow Up In Endocrinology; Future    Comment: A1c greatly improved.  Discussed improvements and he continues to make diet changes.  Today discussed CGM and he is willing to start.  This was added in office today without complications.  Will get data and make further adjustments.  Overall has made many diet changes and adding in exercise.  Lower Lantus today " and see if we can stop. He is only checking in morning so will be helpful to have CGM data to see sugars later in the day.  If needed could stop insulin and adjust Mounjaro or add back in SGLT2.      Lot [id number]  Serial # [id number]  Exp 09/30/2025      Plan:   For now, lower the Lantus 10 units once daily   Continue Mounjaro 10 mg once weekly   Continue metformin 500 mg once daily with dinner    Get fasting labs and urine   Wear Kayce 3   I will look at readings in two weeks   Follow up in 4-6 months.      Hyperlipidemia, unspecified hyperlipidemia type    Orders:    Lipid panel; Future    Comment: Not on statin.  Discussed updated 10 year ASCVD risk as intermediate risk.      Plan:   Get updated labs               [1]   Current Outpatient Medications:     albuterol 90 mcg/actuation inhaler, 2 puffs 4 times a day as needed., Disp: , Rfl:     bacitracin 500 unit/gram ointment, Apply inside the nose 3 times a day for 3 weeks, Disp: 28 g, Rfl: 1    econazole nitrate 1 % cream, Apply between toes and to the bottom of feet twice a day, Disp: 30 g, Rfl: 11    empagliflozin (Jardiance) 10 mg, Take 1 tablet (10 mg) by mouth once daily. (Patient not taking: Reported on 5/23/2025), Disp: 30 tablet, Rfl: 2    insulin glargine (Lantus Solostar U-100 Insulin) 100 unit/mL (3 mL) pen, Inject 15 Units under the skin once daily at bedtime. Take as directed per insulin instructions., Disp: 3 mL, Rfl: 12    levothyroxine (Synthroid, Levoxyl) 150 mcg tablet, Take 1 tablet (150 mcg) by mouth once daily in the morning. Take before meals., Disp: 90 tablet, Rfl: 3    lisinopril 2.5 mg tablet, Take 1 tablet (2.5 mg) by mouth once daily. (Patient not taking: Reported on 5/23/2025), Disp: 100 tablet, Rfl: 3    metFORMIN XR (Glucophage-XR) 500 mg 24 hr tablet, Take 2 tablets (1,000 mg) by mouth 2 times daily (morning and late afternoon). Do not crush, chew, or split. (Patient taking differently: Take 1 tablet (500 mg) by mouth once daily in  the evening. Take with meals. Do not crush, chew, or split.), Disp: 120 tablet, Rfl: 11    sildenafil (Viagra) 50 mg tablet, Take 1 tablet (50 mg) by mouth once daily as needed for erectile dysfunction. 50 mg once daily as needed 1 hour before sexual activity. May increase to a maximum dose of 100 mg once daily if there is incomplete response. (Patient not taking: Reported on 5/23/2025), Disp: 60 tablet, Rfl: 3    tirzepatide (Mounjaro) 10 mg/0.5 mL pen injector, Inject 10 mg under the skin every 7 days., Disp: 2 mL, Rfl: 3

## 2025-06-05 DIAGNOSIS — E78.5 HYPERLIPIDEMIA, UNSPECIFIED HYPERLIPIDEMIA TYPE: Primary | ICD-10-CM

## 2025-06-05 LAB
ALBUMIN SERPL-MCNC: 4.2 G/DL (ref 3.6–5.1)
ALBUMIN/CREAT UR: 5 MG/G CREAT
ALP SERPL-CCNC: 73 U/L (ref 35–144)
ALT SERPL-CCNC: 38 U/L (ref 9–46)
ANION GAP SERPL CALCULATED.4IONS-SCNC: 9 MMOL/L (CALC) (ref 7–17)
AST SERPL-CCNC: 32 U/L (ref 10–35)
BILIRUB SERPL-MCNC: 0.3 MG/DL (ref 0.2–1.2)
BUN SERPL-MCNC: 14 MG/DL (ref 7–25)
CALCIUM SERPL-MCNC: 9 MG/DL (ref 8.6–10.3)
CHLORIDE SERPL-SCNC: 107 MMOL/L (ref 98–110)
CHOLEST SERPL-MCNC: 161 MG/DL
CHOLEST/HDLC SERPL: 3.3 (CALC)
CO2 SERPL-SCNC: 25 MMOL/L (ref 20–32)
CREAT SERPL-MCNC: 0.9 MG/DL (ref 0.7–1.3)
CREAT UR-MCNC: 196 MG/DL (ref 20–320)
EGFRCR SERPLBLD CKD-EPI 2021: 103 ML/MIN/1.73M2
EST. AVERAGE GLUCOSE BLD GHB EST-MCNC: 134 MG/DL
EST. AVERAGE GLUCOSE BLD GHB EST-SCNC: 7.4 MMOL/L
GLUCOSE SERPL-MCNC: 93 MG/DL (ref 65–99)
HBA1C MFR BLD: 6.3 %
HDLC SERPL-MCNC: 49 MG/DL
LDLC SERPL CALC-MCNC: 95 MG/DL (CALC)
MICROALBUMIN UR-MCNC: 1 MG/DL
NONHDLC SERPL-MCNC: 112 MG/DL (CALC)
POTASSIUM SERPL-SCNC: 4.1 MMOL/L (ref 3.5–5.3)
PROT SERPL-MCNC: 6.8 G/DL (ref 6.1–8.1)
PTH-INTACT SERPL-MCNC: 85 PG/ML (ref 16–77)
SODIUM SERPL-SCNC: 141 MMOL/L (ref 135–146)
T4 FREE SERPL-MCNC: 1.9 NG/DL (ref 0.8–1.8)
TRIGL SERPL-MCNC: 76 MG/DL
TSH SERPL-ACNC: 0.52 MIU/L (ref 0.4–4.5)

## 2025-06-05 RX ORDER — ROSUVASTATIN CALCIUM 10 MG/1
10 TABLET, COATED ORAL DAILY
Qty: 90 TABLET | Refills: 1 | Status: SHIPPED | OUTPATIENT
Start: 2025-06-05

## 2025-06-06 ENCOUNTER — TELEPHONE (OUTPATIENT)
Dept: ENDOCRINOLOGY | Facility: CLINIC | Age: 50
End: 2025-06-06
Payer: COMMERCIAL

## 2025-06-06 DIAGNOSIS — N52.9 ERECTILE DYSFUNCTION, UNSPECIFIED ERECTILE DYSFUNCTION TYPE: ICD-10-CM

## 2025-06-06 RX ORDER — SCOPOLAMINE 1 MG/3D
1 PATCH, EXTENDED RELEASE TRANSDERMAL
Qty: 10 PATCH | Refills: 1 | Status: SHIPPED | OUTPATIENT
Start: 2025-06-06

## 2025-06-06 RX ORDER — SILDENAFIL 50 MG/1
50 TABLET, FILM COATED ORAL DAILY PRN
Qty: 20 TABLET | Refills: 1 | Status: SHIPPED | OUTPATIENT
Start: 2025-06-06

## 2025-06-06 NOTE — TELEPHONE ENCOUNTER
Patient called clinic this morning stating that he was seen by you 5/27/25 and your his primary until he gets one. He needs a refill on some medications. Scopolamine patches for a cruise and 90 supply of sildenafil.

## 2025-06-09 ENCOUNTER — DOCUMENTATION (OUTPATIENT)
Dept: ENDOCRINOLOGY | Facility: HOSPITAL | Age: 50
End: 2025-06-09
Payer: COMMERCIAL

## 2025-06-09 DIAGNOSIS — E89.0 POSTOPERATIVE HYPOTHYROIDISM: ICD-10-CM

## 2025-06-09 RX ORDER — LEVOTHYROXINE SODIUM 150 UG/1
150 TABLET ORAL
Qty: 90 TABLET | Refills: 3 | Status: SHIPPED | OUTPATIENT
Start: 2025-06-09

## 2025-06-09 NOTE — PROGRESS NOTES
Following upon lab work after office visit  Clinically Euthyroid   Refills provided for the current dose of LT4 of 150 mcg orally daily - taken adequately  Free T4 mild elevation likely related to medication timing  Patient is aware and agreeable to plan.

## 2025-06-18 ENCOUNTER — TELEPHONE (OUTPATIENT)
Dept: ENDOCRINOLOGY | Facility: CLINIC | Age: 50
End: 2025-06-18
Payer: COMMERCIAL

## 2025-06-18 DIAGNOSIS — E78.5 HYPERLIPIDEMIA, UNSPECIFIED HYPERLIPIDEMIA TYPE: ICD-10-CM

## 2025-06-18 NOTE — TELEPHONE ENCOUNTER
L/V 5-27-25  N/V 10-23-25  Patient called clinic today stating that he has been taking Rosuvastatin 10 mg and is feeling very nauseated to the point he has to leave work. Patient states that he ate breakfast this morning and is feeling very light headed. He had someone pick him up from work and he would like a note from you stating that the medication you prescribed will cause him to feel nauseated at times. He asked if you could put the note in his MyChart. Patient states that he don't feel like he needs to go to an urgent care or ER. He also states that he took the medication this morning with his Levothyroxine.

## 2025-06-18 NOTE — TELEPHONE ENCOUNTER
Called and spoke with patient.  Started rosuvastatin yesterday.  Has nausea yesterday and today.  Now subsiding but he needed to leave work.  Discussed 4-6% of patients can have nausea.  Recommended taking after dinner (larger meal).  If needed he could also cut in half and try lower dose.  If still having nausea after these changes, I told him to notify me sooner if symptoms persist.

## 2025-06-23 ENCOUNTER — TELEPHONE (OUTPATIENT)
Dept: ENDOCRINOLOGY | Facility: CLINIC | Age: 50
End: 2025-06-23
Payer: COMMERCIAL

## 2025-06-23 NOTE — TELEPHONE ENCOUNTER
Please notify patient that I have sent this via my chart.  Please apologize to him for my delay.  Thanks.

## 2025-06-23 NOTE — TELEPHONE ENCOUNTER
Patient called this morning stating that he had spoken to you about a letter he needs for leaving work early last Wednesday. He states that he needs the letter before end of day please.

## 2025-06-23 NOTE — LETTER
June 18, 2025     Juan Mackey    Patient: Juan Mackey   YOB: 1975   Date of Visit: 6/23/2025       To Whom It May Concern:    Patient listed above needed to work leave early on 6/18/2025 due to new medication and some side effects he was experiencing.  Please excuse him from work this day and he will be able to return to work without restriction. If you have questions, please do not hesitate to call me.        Sincerely,     ANGEL Cardenas  Signed electronically to expedite        CC: No Recipients  ______________________________________________________________________________________

## 2025-07-22 DIAGNOSIS — E11.9 TYPE 2 DIABETES MELLITUS WITHOUT COMPLICATION, WITHOUT LONG-TERM CURRENT USE OF INSULIN: ICD-10-CM

## 2025-07-22 DIAGNOSIS — E11.9 DIABETES MELLITUS WITHOUT COMPLICATION: ICD-10-CM

## 2025-07-22 RX ORDER — TIRZEPATIDE 10 MG/.5ML
10 INJECTION, SOLUTION SUBCUTANEOUS
Qty: 2 ML | Refills: 11 | Status: SHIPPED | OUTPATIENT
Start: 2025-07-22

## 2025-07-23 ENCOUNTER — TELEPHONE (OUTPATIENT)
Dept: ENDOCRINOLOGY | Facility: CLINIC | Age: 50
End: 2025-07-23
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
## 2025-07-25 ENCOUNTER — OFFICE VISIT (OUTPATIENT)
Dept: ORTHOPEDIC SURGERY | Facility: CLINIC | Age: 50
End: 2025-07-25
Payer: COMMERCIAL

## 2025-07-25 VITALS — HEIGHT: 67 IN | WEIGHT: 195 LBS | BODY MASS INDEX: 30.61 KG/M2

## 2025-07-25 DIAGNOSIS — S83.91XD SPRAIN OF RIGHT KNEE/LEG, SUBSEQUENT ENCOUNTER: ICD-10-CM

## 2025-07-25 DIAGNOSIS — M17.11 PRIMARY OSTEOARTHRITIS OF RIGHT KNEE: ICD-10-CM

## 2025-07-25 DIAGNOSIS — M25.561 RIGHT KNEE PAIN, UNSPECIFIED CHRONICITY: ICD-10-CM

## 2025-07-25 PROCEDURE — 1036F TOBACCO NON-USER: CPT | Performed by: PHYSICIAN ASSISTANT

## 2025-07-25 PROCEDURE — 99213 OFFICE O/P EST LOW 20 MIN: CPT | Performed by: PHYSICIAN ASSISTANT

## 2025-07-25 PROCEDURE — 3008F BODY MASS INDEX DOCD: CPT | Performed by: PHYSICIAN ASSISTANT

## 2025-07-25 ASSESSMENT — ENCOUNTER SYMPTOMS
LOSS OF SENSATION IN FEET: 0
OCCASIONAL FEELINGS OF UNSTEADINESS: 0
DEPRESSION: 0

## 2025-07-25 ASSESSMENT — PATIENT HEALTH QUESTIONNAIRE - PHQ9
1. LITTLE INTEREST OR PLEASURE IN DOING THINGS: NOT AT ALL
2. FEELING DOWN, DEPRESSED OR HOPELESS: NOT AT ALL
SUM OF ALL RESPONSES TO PHQ9 QUESTIONS 1 AND 2: 0

## 2025-07-25 ASSESSMENT — LIFESTYLE VARIABLES
HOW OFTEN DO YOU HAVE SIX OR MORE DRINKS ON ONE OCCASION: NEVER
SKIP TO QUESTIONS 9-10: 1
AUDIT-C TOTAL SCORE: 0
HOW OFTEN DO YOU HAVE A DRINK CONTAINING ALCOHOL: NEVER
HOW MANY STANDARD DRINKS CONTAINING ALCOHOL DO YOU HAVE ON A TYPICAL DAY: PATIENT DOES NOT DRINK

## 2025-07-25 ASSESSMENT — COLUMBIA-SUICIDE SEVERITY RATING SCALE - C-SSRS
1. IN THE PAST MONTH, HAVE YOU WISHED YOU WERE DEAD OR WISHED YOU COULD GO TO SLEEP AND NOT WAKE UP?: NO
2. HAVE YOU ACTUALLY HAD ANY THOUGHTS OF KILLING YOURSELF?: NO
6. HAVE YOU EVER DONE ANYTHING, STARTED TO DO ANYTHING, OR PREPARED TO DO ANYTHING TO END YOUR LIFE?: NO

## 2025-07-25 ASSESSMENT — PAIN SCALES - GENERAL
PAINLEVEL_OUTOF10: 8
PAINLEVEL_OUTOF10: 8

## 2025-07-25 ASSESSMENT — PAIN - FUNCTIONAL ASSESSMENT: PAIN_FUNCTIONAL_ASSESSMENT: 0-10

## 2025-08-27 DIAGNOSIS — E11.9 TYPE 2 DIABETES MELLITUS WITHOUT COMPLICATION, WITHOUT LONG-TERM CURRENT USE OF INSULIN: ICD-10-CM

## 2025-08-27 RX ORDER — INSULIN GLARGINE 100 [IU]/ML
15 INJECTION, SOLUTION SUBCUTANEOUS NIGHTLY
Qty: 3 ML | Refills: 12 | Status: SHIPPED | OUTPATIENT
Start: 2025-08-27 | End: 2026-08-27

## 2025-08-29 ENCOUNTER — APPOINTMENT (OUTPATIENT)
Dept: SLEEP MEDICINE | Facility: CLINIC | Age: 50
End: 2025-08-29
Payer: COMMERCIAL

## 2025-10-23 ENCOUNTER — APPOINTMENT (OUTPATIENT)
Dept: ENDOCRINOLOGY | Facility: CLINIC | Age: 50
End: 2025-10-23
Payer: COMMERCIAL

## 2025-10-28 ENCOUNTER — APPOINTMENT (OUTPATIENT)
Dept: OPHTHALMOLOGY | Facility: CLINIC | Age: 50
End: 2025-10-28
Payer: COMMERCIAL

## 2025-11-05 ENCOUNTER — APPOINTMENT (OUTPATIENT)
Dept: PRIMARY CARE | Facility: CLINIC | Age: 50
End: 2025-11-05
Payer: COMMERCIAL

## 2026-03-09 ENCOUNTER — APPOINTMENT (OUTPATIENT)
Dept: SLEEP MEDICINE | Facility: CLINIC | Age: 51
End: 2026-03-09
Payer: COMMERCIAL

## 2026-04-27 ENCOUNTER — APPOINTMENT (OUTPATIENT)
Dept: ENDOCRINOLOGY | Facility: CLINIC | Age: 51
End: 2026-04-27
Payer: COMMERCIAL

## 2026-04-30 ENCOUNTER — APPOINTMENT (OUTPATIENT)
Dept: PODIATRY | Facility: CLINIC | Age: 51
End: 2026-04-30
Payer: COMMERCIAL